# Patient Record
Sex: FEMALE | Race: WHITE | NOT HISPANIC OR LATINO
[De-identification: names, ages, dates, MRNs, and addresses within clinical notes are randomized per-mention and may not be internally consistent; named-entity substitution may affect disease eponyms.]

---

## 2019-11-08 ENCOUNTER — APPOINTMENT (OUTPATIENT)
Dept: INTERNAL MEDICINE | Facility: CLINIC | Age: 79
End: 2019-11-08

## 2019-11-10 ENCOUNTER — INPATIENT (INPATIENT)
Facility: HOSPITAL | Age: 79
LOS: 3 days | Discharge: NURSING, REHAB OR LONG-TERM CARE | DRG: 689 | End: 2019-11-14
Attending: INTERNAL MEDICINE | Admitting: INTERNAL MEDICINE
Payer: MEDICARE

## 2019-11-10 VITALS
OXYGEN SATURATION: 99 % | RESPIRATION RATE: 18 BRPM | TEMPERATURE: 98 F | DIASTOLIC BLOOD PRESSURE: 87 MMHG | WEIGHT: 145.06 LBS | HEART RATE: 84 BPM | SYSTOLIC BLOOD PRESSURE: 158 MMHG

## 2019-11-10 DIAGNOSIS — Z90.49 ACQUIRED ABSENCE OF OTHER SPECIFIED PARTS OF DIGESTIVE TRACT: Chronic | ICD-10-CM

## 2019-11-10 DIAGNOSIS — R62.7 ADULT FAILURE TO THRIVE: ICD-10-CM

## 2019-11-10 LAB
ALBUMIN SERPL ELPH-MCNC: 4.2 G/DL — SIGNIFICANT CHANGE UP (ref 3.3–5)
ALP SERPL-CCNC: 72 U/L — SIGNIFICANT CHANGE UP (ref 40–120)
ALT FLD-CCNC: 28 U/L — SIGNIFICANT CHANGE UP (ref 10–45)
ANION GAP SERPL CALC-SCNC: 19 MMOL/L — HIGH (ref 5–17)
APPEARANCE UR: ABNORMAL
AST SERPL-CCNC: 44 U/L — HIGH (ref 10–40)
BACTERIA # UR AUTO: NEGATIVE — SIGNIFICANT CHANGE UP
BASOPHILS # BLD AUTO: 0.02 K/UL — SIGNIFICANT CHANGE UP (ref 0–0.2)
BASOPHILS NFR BLD AUTO: 0.1 % — SIGNIFICANT CHANGE UP (ref 0–2)
BILIRUB SERPL-MCNC: 0.6 MG/DL — SIGNIFICANT CHANGE UP (ref 0.2–1.2)
BILIRUB UR-MCNC: NEGATIVE — SIGNIFICANT CHANGE UP
BUN SERPL-MCNC: 14 MG/DL — SIGNIFICANT CHANGE UP (ref 7–23)
CALCIUM SERPL-MCNC: 10.2 MG/DL — SIGNIFICANT CHANGE UP (ref 8.4–10.5)
CHLORIDE SERPL-SCNC: 99 MMOL/L — SIGNIFICANT CHANGE UP (ref 96–108)
CO2 SERPL-SCNC: 20 MMOL/L — LOW (ref 22–31)
COLOR SPEC: SIGNIFICANT CHANGE UP
CREAT SERPL-MCNC: 0.81 MG/DL — SIGNIFICANT CHANGE UP (ref 0.5–1.3)
DIFF PNL FLD: ABNORMAL
EOSINOPHIL # BLD AUTO: 0 K/UL — SIGNIFICANT CHANGE UP (ref 0–0.5)
EOSINOPHIL NFR BLD AUTO: 0 % — SIGNIFICANT CHANGE UP (ref 0–6)
EPI CELLS # UR: 1 — SIGNIFICANT CHANGE UP
GLUCOSE SERPL-MCNC: 139 MG/DL — HIGH (ref 70–99)
GLUCOSE UR QL: NEGATIVE — SIGNIFICANT CHANGE UP
GRAN CASTS # UR COMP ASSIST: 1 /LPF — SIGNIFICANT CHANGE UP
HCT VFR BLD CALC: 40.1 % — SIGNIFICANT CHANGE UP (ref 34.5–45)
HGB BLD-MCNC: 13.3 G/DL — SIGNIFICANT CHANGE UP (ref 11.5–15.5)
HYALINE CASTS # UR AUTO: 4 /LPF — SIGNIFICANT CHANGE UP (ref 0–7)
IMM GRANULOCYTES NFR BLD AUTO: 0.5 % — SIGNIFICANT CHANGE UP (ref 0–1.5)
KETONES UR-MCNC: ABNORMAL
LEUKOCYTE ESTERASE UR-ACNC: ABNORMAL
LYMPHOCYTES # BLD AUTO: 1.46 K/UL — SIGNIFICANT CHANGE UP (ref 1–3.3)
LYMPHOCYTES # BLD AUTO: 9.1 % — LOW (ref 13–44)
MAGNESIUM SERPL-MCNC: 1.9 MG/DL — SIGNIFICANT CHANGE UP (ref 1.6–2.6)
MCHC RBC-ENTMCNC: 28.5 PG — SIGNIFICANT CHANGE UP (ref 27–34)
MCHC RBC-ENTMCNC: 33.2 GM/DL — SIGNIFICANT CHANGE UP (ref 32–36)
MCV RBC AUTO: 86.1 FL — SIGNIFICANT CHANGE UP (ref 80–100)
MONOCYTES # BLD AUTO: 1.31 K/UL — HIGH (ref 0–0.9)
MONOCYTES NFR BLD AUTO: 8.2 % — SIGNIFICANT CHANGE UP (ref 2–14)
NEUTROPHILS # BLD AUTO: 13.18 K/UL — HIGH (ref 1.8–7.4)
NEUTROPHILS NFR BLD AUTO: 82.1 % — HIGH (ref 43–77)
NITRITE UR-MCNC: NEGATIVE — SIGNIFICANT CHANGE UP
NRBC # BLD: 0 /100 WBCS — SIGNIFICANT CHANGE UP (ref 0–0)
PH UR: 6 — SIGNIFICANT CHANGE UP (ref 5–8)
PHOSPHATE SERPL-MCNC: 2.9 MG/DL — SIGNIFICANT CHANGE UP (ref 2.5–4.5)
PLATELET # BLD AUTO: 262 K/UL — SIGNIFICANT CHANGE UP (ref 150–400)
POTASSIUM SERPL-MCNC: 4.1 MMOL/L — SIGNIFICANT CHANGE UP (ref 3.5–5.3)
POTASSIUM SERPL-SCNC: 4.1 MMOL/L — SIGNIFICANT CHANGE UP (ref 3.5–5.3)
PROT SERPL-MCNC: 8.2 G/DL — SIGNIFICANT CHANGE UP (ref 6–8.3)
PROT UR-MCNC: ABNORMAL
RBC # BLD: 4.66 M/UL — SIGNIFICANT CHANGE UP (ref 3.8–5.2)
RBC # FLD: 13.2 % — SIGNIFICANT CHANGE UP (ref 10.3–14.5)
RBC CASTS # UR COMP ASSIST: 3 /HPF — SIGNIFICANT CHANGE UP (ref 0–4)
SODIUM SERPL-SCNC: 138 MMOL/L — SIGNIFICANT CHANGE UP (ref 135–145)
SP GR SPEC: 1.04 — HIGH (ref 1.01–1.02)
T PALLIDUM AB TITR SER: NEGATIVE — SIGNIFICANT CHANGE UP
TROPONIN T, HIGH SENSITIVITY RESULT: 22 NG/L — SIGNIFICANT CHANGE UP (ref 0–51)
UROBILINOGEN FLD QL: NEGATIVE — SIGNIFICANT CHANGE UP
WBC # BLD: 16.05 K/UL — HIGH (ref 3.8–10.5)
WBC # FLD AUTO: 16.05 K/UL — HIGH (ref 3.8–10.5)
WBC UR QL: 5 /HPF — SIGNIFICANT CHANGE UP (ref 0–5)

## 2019-11-10 PROCEDURE — 71260 CT THORAX DX C+: CPT | Mod: 26

## 2019-11-10 PROCEDURE — 74177 CT ABD & PELVIS W/CONTRAST: CPT | Mod: 26

## 2019-11-10 PROCEDURE — 72125 CT NECK SPINE W/O DYE: CPT | Mod: 26

## 2019-11-10 PROCEDURE — 99285 EMERGENCY DEPT VISIT HI MDM: CPT

## 2019-11-10 PROCEDURE — 70450 CT HEAD/BRAIN W/O DYE: CPT | Mod: 26

## 2019-11-10 RX ORDER — ENOXAPARIN SODIUM 100 MG/ML
30 INJECTION SUBCUTANEOUS DAILY
Refills: 0 | Status: DISCONTINUED | OUTPATIENT
Start: 2019-11-10 | End: 2019-11-14

## 2019-11-10 RX ORDER — LEVOTHYROXINE SODIUM 125 MCG
1 TABLET ORAL
Qty: 0 | Refills: 0 | DISCHARGE

## 2019-11-10 RX ORDER — ASPIRIN/CALCIUM CARB/MAGNESIUM 324 MG
81 TABLET ORAL DAILY
Refills: 0 | Status: DISCONTINUED | OUTPATIENT
Start: 2019-11-10 | End: 2019-11-14

## 2019-11-10 RX ORDER — AMLODIPINE BESYLATE 2.5 MG/1
5 TABLET ORAL DAILY
Refills: 0 | Status: DISCONTINUED | OUTPATIENT
Start: 2019-11-10 | End: 2019-11-13

## 2019-11-10 RX ORDER — ESCITALOPRAM OXALATE 10 MG/1
20 TABLET, FILM COATED ORAL DAILY
Refills: 0 | Status: DISCONTINUED | OUTPATIENT
Start: 2019-11-10 | End: 2019-11-14

## 2019-11-10 RX ORDER — SODIUM CHLORIDE 9 MG/ML
1000 INJECTION INTRAMUSCULAR; INTRAVENOUS; SUBCUTANEOUS ONCE
Refills: 0 | Status: COMPLETED | OUTPATIENT
Start: 2019-11-10 | End: 2019-11-10

## 2019-11-10 RX ORDER — ESCITALOPRAM OXALATE 10 MG/1
1 TABLET, FILM COATED ORAL
Qty: 0 | Refills: 0 | DISCHARGE

## 2019-11-10 RX ORDER — ATORVASTATIN CALCIUM 80 MG/1
10 TABLET, FILM COATED ORAL AT BEDTIME
Refills: 0 | Status: DISCONTINUED | OUTPATIENT
Start: 2019-11-10 | End: 2019-11-14

## 2019-11-10 RX ADMIN — SODIUM CHLORIDE 1000 MILLILITER(S): 9 INJECTION INTRAMUSCULAR; INTRAVENOUS; SUBCUTANEOUS at 18:26

## 2019-11-10 NOTE — ED PROVIDER NOTE - ATTENDING CONTRIBUTION TO CARE
80yo female pmh HTN, hypothyroid p/w unwitnessed fall, pt unable to explain events of fall, unknown time. Pt lives alone "with tenants" as per niece who states pt is becoming more forgetful and has had slurred speech x 2-3 months. On aspirin intermittently. Pt AAOx3, slurred speech, lungs clear, no murmurs, abd soft, extremities full ROM and full strength. EOMI PERRL. ~C7 T1 spinal TTP with no stepoffs. Ecchymosis left flank. Concern for worsening dementia vs acute exacerbation of dementia from infectious source. Labs with UA, B12 folate, CK and trop, pan scan due to ecchymosis and spinal tenderness. IVF. EKG No STEMI. Despite AAOx3, pt with slow responses, unsure of events, appears to be unsafe discharge if workup negative. Will discuss further with pt and family

## 2019-11-10 NOTE — ED PROVIDER NOTE - OBJECTIVE STATEMENT
78 y/o woman with PMHx HTN, dementia, hypothyroid BIBEMS for unwitnessed fall. Per niece and sister at bedside, patient lives alone, and for the last "couple" of months developed dysarthria and had been getting progressively more forgetful for the last (6 months?) or so. Never evaluated for stroke, never evaluated for dementia. She does not have any children and she is , and does not have assistance with ADLs. Sister is next of kin. Last evening, patient called niece's residence. She is unaccounted for until this AM around 10, when sister called her about [employer] and learned she had fallen down. A neighbor called 911. Patient has no memory of what happened. Fall was unwitnessed, unknown if there was LOC. Denies pain, however, poor historian. No fever, chills, chest pain, SOB, n/v/d. Unclear if patient has been able to take medications as prescribed.

## 2019-11-10 NOTE — H&P ADULT - NSICDXPASTMEDICALHX_GEN_ALL_CORE_FT
PAST MEDICAL HISTORY:  Depression     Diverticulosis     Hypertension, unspecified type     Hypothyroidism, unspecified type

## 2019-11-10 NOTE — ED PROVIDER NOTE - PROGRESS NOTE DETAILS
pending CTH/CAP, labs, consider social admit, pt lives alone and cannot perform ADLs CT negative for traumatic pathology. +leukocytosis, pt encouraged to urinate now. If unable, will bladder scan and place Bull if necessary

## 2019-11-10 NOTE — H&P ADULT - ASSESSMENT
80 yo woman w progressive worsening in function w memory loss, frequent falls, dysarthria, house unkept, not clear if taking her meds, ptn is unkept, has multiple echymoses  PLAN:  R/O recent CVA w MRI/MRA H/N, Neuro consult  get TTE, get Carotid DUplex  get LE Duplex  hydrate w NS, trend BPs, check TSH, Vit B12, Folate,RPR  rpt EKG in am  PT eval  SOcial work consult  Niece is not her HCP and has looked at multiple assisted living and it appears ptn is ready for that transition  C d/w ptn/niece, she is full code  DVT ppx w sc Lovenox

## 2019-11-10 NOTE — ED ADULT NURSE NOTE - INTERVENTIONS DEFINITIONS
Stretcher in lowest position, wheels locked, appropriate side rails in place/Call bell, personal items and telephone within reach/Physically safe environment: no spills, clutter or unnecessary equipment/Provide visual clues: red socks Call bell, personal items and telephone within reach/Non-slip footwear when patient is off stretcher/Stretcher in lowest position, wheels locked, appropriate side rails in place/Provide visual cue, wrist band, yellow gown, etc./Physically safe environment: no spills, clutter or unnecessary equipment/Provide visual clues: red socks

## 2019-11-10 NOTE — ED ADULT NURSE REASSESSMENT NOTE - NS ED NURSE REASSESS COMMENT FT1
Pt laying comfortably in bed, accompanied by family. Pt and family aware of plan of care, awaiting CT scans, will continue to monitor.

## 2019-11-10 NOTE — ED ADULT NURSE NOTE - OBJECTIVE STATEMENT
79 year old female presenting to ED by EMS accompanied by family s/p fall early this morning. Family reports pt fell 2x this week, and over past few months the pt has noted slurred speech and forgetfulness. Pt does not recall how she fell and was on the floor for an unknown amount of time. Pt states, "I do not know why I'm here." Pt denies dizziness, chest pain, SOB, abdominal pain, N/V. 79 year old female presenting to ED by EMS accompanied by family s/p fall early this morning. Family reports pt fell 2x this week, and over past few months the pt has noted slurred speech and forgetfulness. Pt does not recall how she fell and was on the floor for an unknown amount of time. Pt states, "I do not know why I'm here." Pt denies headaches, changes in vision, chills, fevers, dizziness, chest pain, SOB, abdominal pain, N/V, burning or frequency upon urination, numbness and tingling.

## 2019-11-10 NOTE — ED PROVIDER NOTE - NS ED ROS FT
REVIEW OF SYSTEMS:    CONSTITUTIONAL: No weakness, fevers or chills  EYES/ENT: No visual changes;  no throat pain   NECK: No pain or stiffness  RESPIRATORY: No cough, wheezing, hemoptysis; No shortness of breath  CARDIOVASCULAR: No chest pain or palpitations  GASTROINTESTINAL: No abdominal or epigastric pain. No nausea, vomiting, or hematemesis; No diarrhea or constipation. No melena or hematochezia.  GENITOURINARY: No dysuria, change in frequency or hematuria  NEUROLOGICAL: No numbness or weakness, +spine pain  SKIN: No itching, burning, rashes, or lesions   All other review of systems is negative unless indicated above.

## 2019-11-10 NOTE — H&P ADULT - NSHPPHYSICALEXAM_GEN_ALL_CORE
T(F): 99.3 (11-10-19 @ 18:25), Max: 99.3 (11-10-19 @ 18:25)  HR: 76 (11-10-19 @ 17:11) (76 - 84)  BP: 129/85 (11-10-19 @ 17:11) (129/85 - 158/87)  RR: 18 (11-10-19 @ 17:11) (18 - 18)  SpO2: 98% (11-10-19 @ 17:11) (98% - 99%)    GENERAL: NAD, well-developed  HEAD:  Atraumatic, Normocephalic  EYES: EOMI, PERRLA, conjunctiva and sclera clear  NECK: Supple, No JVD  CHEST/LUNG: Clear to auscultation bilaterally; No wheeze  HEART: Regular rate and rhythm; No murmurs, rubs, or gallops  ABDOMEN: Soft, Nontender, Nondistended; Bowel sounds present  EXTREMITIES:  2+ Peripheral Pulses, No clubbing, cyanosis, or edema  PSYCH: AAOx3  NEUROLOGY: non-focal  SKIN: echymoses over flank, back

## 2019-11-10 NOTE — ED PROVIDER NOTE - PHYSICAL EXAMINATION
GENERAL: NAD  HEENT: EOMI, PERRL, MMM, no oropharyngeal lesions or erythema appreciated  Pulm: normal work of breathing, CTABL  CV: RRR, S1&S2+, no m/r/g appreciated  ABDOMEN: soft, nt, nd, no hepatosplenomegaly, BS+, +flank hematoma  MSK: nl ROM, , moving all extremities, tenderness over thoracic spine  EXTREMITIES:  no appreciable edema in b/l LE  Neuro: A&Ox3, no focal deficits  SKIN: warm and dry, no visible rash

## 2019-11-10 NOTE — H&P ADULT - HISTORY OF PRESENT ILLNESS
78 y/o woman with PMHx HTN, forgetfulness but still drives, hypothyroid but not on synthroid BIBEMS for unwitnessed fall. Per niece and sister at bedside, patient lives alone, and for the last "couple" of months developed dysarthria and had been getting progressively more forgetful for the last 6 months or so. Ptns house is unkept and for the past 2 months ptn has been unkept. Ptn has been resistant to see a doctor. Never evaluated for stroke, never evaluated for dementia. She does not have any children and she is , and does not have assistance with ADLs. Sister is next of kin. But while ptn is being evaluated she named her niece her HCP.   Last evening, patient called niece's residence. She is unaccounted for until this AM around 10, when sister called her about [employer] and learned she had fallen down. A neighbor called 911. Patient has no memory of what happened. Fall was unwitnessed, unknown if there was LOC. Denies pain, however, poor historian. Ptn has no insight into her progressive worsening function.  No fever, chills, chest pain, SOB, n/v/d. Unclear if patient has been able to take medications as prescribed.

## 2019-11-11 DIAGNOSIS — R47.9 UNSPECIFIED SPEECH DISTURBANCES: ICD-10-CM

## 2019-11-11 LAB
ANION GAP SERPL CALC-SCNC: 19 MMOL/L — HIGH (ref 5–17)
BUN SERPL-MCNC: 12 MG/DL — SIGNIFICANT CHANGE UP (ref 7–23)
CALCIUM SERPL-MCNC: 10.1 MG/DL — SIGNIFICANT CHANGE UP (ref 8.4–10.5)
CHLORIDE SERPL-SCNC: 98 MMOL/L — SIGNIFICANT CHANGE UP (ref 96–108)
CK SERPL-CCNC: 1235 U/L — HIGH (ref 25–170)
CO2 SERPL-SCNC: 21 MMOL/L — LOW (ref 22–31)
CREAT SERPL-MCNC: 0.82 MG/DL — SIGNIFICANT CHANGE UP (ref 0.5–1.3)
CULTURE RESULTS: SIGNIFICANT CHANGE UP
FOLATE SERPL-MCNC: >20 NG/ML — SIGNIFICANT CHANGE UP
FOLATE SERPL-MCNC: >20 NG/ML — SIGNIFICANT CHANGE UP
GLUCOSE SERPL-MCNC: 101 MG/DL — HIGH (ref 70–99)
HBA1C BLD-MCNC: 5.4 % — SIGNIFICANT CHANGE UP (ref 4–5.6)
HCT VFR BLD CALC: 40.9 % — SIGNIFICANT CHANGE UP (ref 34.5–45)
HGB BLD-MCNC: 13.5 G/DL — SIGNIFICANT CHANGE UP (ref 11.5–15.5)
MCHC RBC-ENTMCNC: 28.5 PG — SIGNIFICANT CHANGE UP (ref 27–34)
MCHC RBC-ENTMCNC: 33 GM/DL — SIGNIFICANT CHANGE UP (ref 32–36)
MCV RBC AUTO: 86.5 FL — SIGNIFICANT CHANGE UP (ref 80–100)
PLATELET # BLD AUTO: 268 K/UL — SIGNIFICANT CHANGE UP (ref 150–400)
POTASSIUM SERPL-MCNC: 3.7 MMOL/L — SIGNIFICANT CHANGE UP (ref 3.5–5.3)
POTASSIUM SERPL-SCNC: 3.7 MMOL/L — SIGNIFICANT CHANGE UP (ref 3.5–5.3)
RBC # BLD: 4.73 M/UL — SIGNIFICANT CHANGE UP (ref 3.8–5.2)
RBC # FLD: 13.4 % — SIGNIFICANT CHANGE UP (ref 10.3–14.5)
SODIUM SERPL-SCNC: 138 MMOL/L — SIGNIFICANT CHANGE UP (ref 135–145)
SPECIMEN SOURCE: SIGNIFICANT CHANGE UP
T PALLIDUM AB TITR SER: NEGATIVE — SIGNIFICANT CHANGE UP
TSH SERPL-MCNC: 1.08 UIU/ML — SIGNIFICANT CHANGE UP (ref 0.27–4.2)
TSH SERPL-MCNC: 1.7 UIU/ML — SIGNIFICANT CHANGE UP (ref 0.27–4.2)
VIT B12 SERPL-MCNC: 1073 PG/ML — SIGNIFICANT CHANGE UP (ref 232–1245)
VIT B12 SERPL-MCNC: 1140 PG/ML — SIGNIFICANT CHANGE UP (ref 232–1245)
WBC # BLD: 14.36 K/UL — HIGH (ref 3.8–10.5)
WBC # FLD AUTO: 14.36 K/UL — HIGH (ref 3.8–10.5)

## 2019-11-11 PROCEDURE — 70551 MRI BRAIN STEM W/O DYE: CPT | Mod: 26

## 2019-11-11 PROCEDURE — 99222 1ST HOSP IP/OBS MODERATE 55: CPT

## 2019-11-11 PROCEDURE — 70544 MR ANGIOGRAPHY HEAD W/O DYE: CPT | Mod: 26,59

## 2019-11-11 PROCEDURE — 93880 EXTRACRANIAL BILAT STUDY: CPT | Mod: 26

## 2019-11-11 PROCEDURE — 70547 MR ANGIOGRAPHY NECK W/O DYE: CPT | Mod: 26

## 2019-11-11 RX ORDER — CEFTRIAXONE 500 MG/1
INJECTION, POWDER, FOR SOLUTION INTRAMUSCULAR; INTRAVENOUS
Refills: 0 | Status: DISCONTINUED | OUTPATIENT
Start: 2019-11-11 | End: 2019-11-14

## 2019-11-11 RX ORDER — CEFTRIAXONE 500 MG/1
1000 INJECTION, POWDER, FOR SOLUTION INTRAMUSCULAR; INTRAVENOUS ONCE
Refills: 0 | Status: COMPLETED | OUTPATIENT
Start: 2019-11-11 | End: 2019-11-11

## 2019-11-11 RX ORDER — SODIUM CHLORIDE 9 MG/ML
1000 INJECTION INTRAMUSCULAR; INTRAVENOUS; SUBCUTANEOUS
Refills: 0 | Status: DISCONTINUED | OUTPATIENT
Start: 2019-11-11 | End: 2019-11-14

## 2019-11-11 RX ORDER — CEFTRIAXONE 500 MG/1
1000 INJECTION, POWDER, FOR SOLUTION INTRAMUSCULAR; INTRAVENOUS EVERY 24 HOURS
Refills: 0 | Status: DISCONTINUED | OUTPATIENT
Start: 2019-11-12 | End: 2019-11-14

## 2019-11-11 RX ADMIN — Medication 81 MILLIGRAM(S): at 14:19

## 2019-11-11 RX ADMIN — ESCITALOPRAM OXALATE 20 MILLIGRAM(S): 10 TABLET, FILM COATED ORAL at 14:19

## 2019-11-11 RX ADMIN — AMLODIPINE BESYLATE 5 MILLIGRAM(S): 2.5 TABLET ORAL at 06:34

## 2019-11-11 RX ADMIN — ENOXAPARIN SODIUM 30 MILLIGRAM(S): 100 INJECTION SUBCUTANEOUS at 14:19

## 2019-11-11 RX ADMIN — CEFTRIAXONE 100 MILLIGRAM(S): 500 INJECTION, POWDER, FOR SOLUTION INTRAMUSCULAR; INTRAVENOUS at 14:20

## 2019-11-11 RX ADMIN — ATORVASTATIN CALCIUM 10 MILLIGRAM(S): 80 TABLET, FILM COATED ORAL at 21:16

## 2019-11-11 RX ADMIN — SODIUM CHLORIDE 75 MILLILITER(S): 9 INJECTION INTRAMUSCULAR; INTRAVENOUS; SUBCUTANEOUS at 10:55

## 2019-11-11 NOTE — CONSULT NOTE ADULT - ASSESSMENT
78 y/o woman with PMHx HTN, hypothyroid, progressive dysarthtia, BIBEMS for unwitnessed fall. ENT consulted for slurred speech and dysphonia per primary team.  Per family at bedside they state patient voice appears to sound as baseline with slight slur. Tone, strength and sound is same. Per exam pt exhibits quality sound and strength without suspicion for vocal paralysis. Bedside laryngoscopy attempted multiple times to access for possible vocal cord etiology. Pt uncooperative and further refused scope. Risks and complications discussed with the patient and family for refusing scope.

## 2019-11-11 NOTE — CONSULT NOTE ADULT - SUBJECTIVE AND OBJECTIVE BOX
[facility]        Patient is a 79y old  Female who presents with a chief complaint of slurred speech, frequent falls (2019 10:55)    Excerpt from H&P,80 y/o woman with PMHx HTN, forgetfulness but still drives, hypothyroid but not on synthroid BIBEMS for unwitnessed fall. Per niece and sister at bedside, patient lives alone, and for the last "couple" of months developed dysarthria and had been getting progressively more forgetful for the last 6 months or so. Ptns house is unkept and for the past 2 months ptn has been unkept. Ptn has been resistant to see a doctor. Never evaluated for stroke, never evaluated for dementia. She does not have any children and she is , and does not have assistance with ADLs. Sister is next of kin. But while ptn is being evaluated she named her niece her HCP.   Last evening, patient called niece's residence. She is unaccounted for until this AM around 10, when sister called her about [employer] and learned she had fallen down. A neighbor called 911. Patient has no memory of what happened. Fall was unwitnessed, unknown if there was LOC. Denies pain, however, poor historian. Ptn has no insight into her progressive worsening function.  No fever, chills, chest pain, SOB, n/v/d. Unclear if patient has been able to take medications as prescribed.            *****PAST MEDICAL / Surgical  HISTORY:  PAST MEDICAL & SURGICAL HISTORY:  Diverticulosis  Depression  Hypertension, unspecified type  Hypothyroidism, unspecified type  History of cholecystectomy           *****FAMILY HISTORY:  FAMILY HISTORY:  No pertinent family history in first degree relatives           *****SOCIAL HISTORY:  Alcohol: None  Smoking: None  worked as a  at [employer]        *****ALLERGIES:   Allergies    No Known Allergies    Intolerances             *****MEDICATIONS: current medication reviewed and documented.   MEDICATIONS  (STANDING):  amLODIPine   Tablet 5 milliGRAM(s) Oral daily  aspirin enteric coated 81 milliGRAM(s) Oral daily  atorvastatin 10 milliGRAM(s) Oral at bedtime  enoxaparin Injectable 30 milliGRAM(s) SubCutaneous daily  escitalopram 20 milliGRAM(s) Oral daily  sodium chloride 0.9%. 1000 milliLiter(s) (75 mL/Hr) IV Continuous <Continuous>    MEDICATIONS  (PRN):           *****REVIEW OF SYSTEM:  GEN: no fever, no chills, no pain  RESP: no SOB, no cough, no sputum  CVS: no chest pain, no palpitations, no edema  GI: no abdominal pain, no nausea, no vomiting, no constipation, no diarrhea  : no dysurea, no frequency, no hematurea  Neuro: no headache, no dizziness  PSYCH: no anxiety, no depression  Derm : no itching, no rash         *****VITAL SIGNS:  T(C): 36.2 (19 @ 12:28), Max: 37.4 (11-10-19 @ 18:25)  HR: 74 (19 @ 12:) (74 - 84)  BP: 139/85 (19 @ 12:28) (129/85 - 158/87)  RR: 18 (19 @ 12:28) (18 - 18)  SpO2: 98% (19 @ 12:28) (97% - 100%)  Wt(kg): --           *****PHYSICAL EXAM:   Alert oriented x 3   Attention comprehension are fair. Able to name, repeat, read without any difficulty.   Able to follow 3 step commands.     EOM limited upgaze did not appreciate fatiguing with upgaze    VFF to confrontration   upgaze with nystagmus     No facial asymmetry   dysarthria    hoarse voice   Tongue is midline, atrophy of tongue   Palate elevates symmetrically   Moving all 4 ext symmetrically no pronator drift   Reflexes are 2+ throughout   no fasciculations of the muscles noted.   sensation is grossly symmetric  Gait : not assessed.  B/L down going toes      no clonus          *****LAB AND IMAGIN.5   14.36 )-----------( 268      ( 2019 08:07 )             40.9               11-11    138  |  98  |  12  ----------------------------<  101<H>  3.7   |  21<L>  |  0.82    Ca    10.1      2019 06:26  Phos  2.9     11-10  Mg     1.9     11-10    TPro  8.2  /  Alb  4.2  /  TBili  0.6  /  DBili  x   /  AST  44<H>  /  ALT  28  /  AlkPhos  72  11-10                CARDIAC MARKERS ( 2019 06:26 )  x     / x     / 1235 U/L / x     / x      CARDIAC MARKERS ( 10 Nov 2019 18:44 )  x     / x     / 1028 U/L / x     / x                  Urinalysis Basic - ( 10 Nov 2019 21:51 )    Color: Light Yellow / Appearance: Slightly Turbid / S.036 / pH: x  Gluc: x / Ketone: Large  / Bili: Negative / Urobili: Negative   Blood: x / Protein: 30 mg/dL / Nitrite: Negative   Leuk Esterase: Moderate / RBC: 3 /hpf / WBC 5 /HPF   Sq Epi: x / Non Sq Epi: 1 / Bacteria: Negative    < from: CT Head No Cont (11.10.19 @ 20:03) >  The CT examination demonstrates generalized volume loss as manifested by   the enlargement of the ventricles, cisternal spaces, and cortical sulci   throughout.     There is no acute intracranial hemorrhage, mass effect, or midline shift.     Mild patchy hypodensities is noted in the periventricular white matter   which most likely represents chronic microvascular ischemic changes given   the patient's age.     The ventricles and sulci are appropriate in size and configuration for   the patient's age.     The visualized paranasal sinuses andmastoids are well aerated.     The bony calvarium is intact. The visualized soft tissues are   unremarkable.    CERVICAL SPINE    There is reversal the normal cervical lordosis. There is no acute   displaced cervical spine fracture or evidence of traumatic malalignment.     There are multilevel degenerative changes characterized by disc   osteophyte complexes, facet and uncinate hypertrophy. This results in   mild   multilevel canal stenosis and multilevel neural foraminal narrowing. Disc   height loss is seen throughout the entirety of the cervical spine most   prominent at the C5-C6 and C7-T1 levels.    The prevertebral soft tissues are not widened. The regional soft tissues   of the neck are unremarkable. The lung apices are clear.        IMPRESSION:     CT Head: There is no acute intracranial hemorrhage, mass effect, or   midline shift.    CT Cervical Spine: No acute displaced cervical spine fracture or evidence   of traumatic malalignment.     < end of copied text >      [All pertinent recent Imaging reports reviewed]         *****A S S E S S M E N T   A N D   P L A N :        80 y/o woman with PMHx HTN, forgetfulness but still drives, hypothyroid but not on synthroid BIBEMS for unwitnessed fall. Per niece and sister at bedside, patient lives alone, and for the last "couple" of months developed dysarthria and had been getting progressively more forgetful for the last 6 months or so. Ptns house is unkept and for the past 2 months ptn has been unkept. Ptn has been resistant to see a doctor. Never evaluated for stroke, never evaluated for dementia. She does not have any children and she is , and does not have assistance with ADLs. Sister is next of kin. But while ptn is being evaluated she named her niece her HCP.   Last evening, patient called niece's residence. She is unaccounted for until this AM around 10, when sister called her about [employer] and learned she had fallen down. A neighbor called 911. Patient has no memory of what happened. Fall was unwitnessed, unknown if there was LOC. Denies pain, however, poor historian. Ptn has no insight into her progressive worsening function.  No fever, chills, chest pain, SOB, n/v/d. Unclear if patient has been able to take medications as prescribed.       Problem/Recommendations 1: dysarthia, imbalance with progressive decline over the last 2 mos.   also noted on exam tongue atrophy, possible tongue fasiculations.   difficulty with upgaze, hoarseness.     concerning for cva in the thalamus/brain stem  vs. neuromuscular disease vs. progressive supranuclear palsy   mri brain   mra head  will get acetylcholine receptor antibody  emg/ncv outpt maybe helpful   elevated CPK likely rhabdo  s/s eval as pt seems to be having difficulty with food bolus   ck for vocal cord paralysis  please speak to medical attending before ordering any of the studies.         Problem/Recommendations 2: elevated CPK   trend please        ___________________________  Will follow with you.  Thank you,  Wendy Joyce MD  Diplomate of the American Board of Neurology and Psychiatry.  Diplomate of the American Board of Vascular Neurology.   [facility]   Ph: [phone]    Differential diagnosis and plan of care discussed with patient after the evaluation.   Advanced care planning options discussed.   Pain assessed and judicious use of narcotics when appropriate was discussed.  Importance of Fall prevention discussed.  Counseling on Smoking and Alcohol cessation was offered when appropriate.  Counseling on Diet, exercise, and medication compliance was done.     123 minutes spent on the total encounter;  more than 50 % of the visit was spent on counseling  and or coordinating care by the attending physician.    Thank you for allowing me to participate in the care of this nina patient. Please do not hesitate to call me if you have any questions.     This and subsequent notes were partially created using voice recognition software and will  inherently be subject to errors including those of syntax and sound alike substitutions which may escape proofreading. In such instances original meaning may be extrapolated by contextual derivation.

## 2019-11-11 NOTE — CONSULT NOTE ADULT - SUBJECTIVE AND OBJECTIVE BOX
CHIEF COMPLAINT:      HISTORY OF PRESENT ILLNESS:  80 y/o woman with PMHx HTN, forgetfulness but still drives, hypothyroid but not on synthroid BIBEMS for unwitnessed fall. Per niece and sister at bedside, patient lives alone, and for the last "couple" of months developed dysarthria and had been getting progressively more forgetful for the last 6 months or so. Ptns house is unkept and for the past 2 months ptn has been unkept. Ptn has been resistant to see a doctor. Never evaluated for stroke, never evaluated for dementia. She does not have any children and she is , and does not have assistance with ADLs. Sister is next of kin. But while ptn is being evaluated she named her niece her HCP.   Last evening, patient called niece's residence. She is unaccounted for until this AM around 10, when sister called her about [employer] and learned she had fallen down. A neighbor called 911. Patient has no memory of what happened. Fall was unwitnessed, unknown if there was LOC. Denies pain, however, poor historian. Ptn has no insight into her progressive worsening function.  No fever, chills, chest pain, SOB, n/v/d. Unclear if patient has been able to take medications as prescribed.        PAST MEDICAL & SURGICAL HISTORY:  Diverticulosis  Depression  Hypertension, unspecified type  Hypothyroidism, unspecified type  History of cholecystectomy          MEDICATIONS:  amLODIPine   Tablet 5 milliGRAM(s) Oral daily  aspirin enteric coated 81 milliGRAM(s) Oral daily  enoxaparin Injectable 30 milliGRAM(s) SubCutaneous daily        escitalopram 20 milliGRAM(s) Oral daily      atorvastatin 10 milliGRAM(s) Oral at bedtime    sodium chloride 0.9%. 1000 milliLiter(s) IV Continuous <Continuous>      FAMILY HISTORY:  No pertinent family history in first degree relatives      SOCIAL HISTORY:    [ ] Non-smoker  [ ] Smoker  [ ] Alcohol    Allergies    No Known Allergies    Intolerances    	    REVIEW OF SYSTEMS:  CONSTITUTIONAL: No fever, weight loss, + fatigue  EYES: No eye pain, visual disturbances, or discharge  ENMT:  No difficulty hearing, tinnitus, vertigo; No sinus or throat pain  NECK: No pain or stiffness  RESPIRATORY: No cough, wheezing, chills or hemoptysis; No Shortness of Breath  CARDIOVASCULAR: No chest pain, palpitations, passing out, dizziness, or leg swelling  GASTROINTESTINAL: No abdominal or epigastric pain. No nausea, vomiting, or hematemesis; No diarrhea or constipation. No melena or hematochezia.  GENITOURINARY: No dysuria, frequency, hematuria, or incontinence  NEUROLOGICAL: No headaches, memory loss, ++loss of strength, numbness, or tremors  SKIN: No itching, burning, rashes, or lesions   LYMPH Nodes: No enlarged glands  ENDOCRINE: No heat or cold intolerance; No hair loss  MUSCULOSKELETAL:+ joint pain or swelling; No muscle, back, or extremity pain  PSYCHIATRIC: No depression, anxiety, mood swings, or difficulty sleeping  HEME/LYMPH: No easy bruising, or bleeding gums  ALLERY AND IMMUNOLOGIC: No hives or eczema	    [ ] All others negative	  [ ] Unable to obtain    PHYSICAL EXAM:  T(C): 36.5 (11-11-19 @ 08:01), Max: 37.4 (11-10-19 @ 18:25)  HR: 75 (11-11-19 @ 08:01) (75 - 84)  BP: 137/77 (11-11-19 @ 08:01) (129/85 - 158/87)  RR: 18 (11-11-19 @ 08:01) (18 - 18)  SpO2: 97% (11-11-19 @ 08:01) (97% - 100%)  Wt(kg): --  I&O's Summary      Appearance: Normal	  HEENT:   Normal oral mucosa, PERRL, EOMI	  Lymphatic: No lymphadenopathy  Cardiovascular: Normal S1 S2, No JVD, No murmurs, No edema  Respiratory: Lungs clear to auscultation	  Psychiatry: A & O x 3, Mood & affect appropriate  Gastrointestinal:  Soft, Non-tender, + BS	  Skin: No rashes, No ecchymoses, No cyanosis	  Neurologic: Non-focal  Extremities: Normal range of motion, No clubbing, cyanosis or edema  Vascular: Peripheral pulses palpable 2+ bilaterally    TELEMETRY: 	    ECG:  	NSR, LVH, non specific stt changes   RADIOLOGY: < from: CT Head No Cont (11.10.19 @ 20:03) >    EXAM:  CT CERVICAL SPINE                          EXAM:  CT BRAIN                            PROCEDURE DATE:  11/10/2019            INTERPRETATION:  CLINICAL INFORMATION: Trauma; head strike; fall.    COMPARISON: None available.    TECHNIQUE: Multiple axial CT images of the head and cervical spine were   obtained without the administration of intravenous contrast. Sagittal and   coronal reformatted images were also reviewed.    FINDINGS:    HEAD     The CT examination demonstrates generalized volume loss as manifested by   the enlargement of the ventricles, cisternal spaces, and cortical sulci   throughout.     There is no acute intracranial hemorrhage, mass effect, or midline shift.     Mild patchy hypodensities is noted in the periventricular white matter   which most likely represents chronic microvascular ischemic changes given   the patient's age.     The ventricles and sulci are appropriate in size and configuration for   the patient's age.     The visualized paranasal sinuses andmastoids are well aerated.     The bony calvarium is intact. The visualized soft tissues are   unremarkable.    CERVICAL SPINE    There is reversal the normal cervical lordosis. There is no acute   displaced cervical spine fracture or evidence of traumatic malalignment.     There are multilevel degenerative changes characterized by disc   osteophyte complexes, facet and uncinate hypertrophy. This results in   mild   multilevel canal stenosis and multilevel neural foraminal narrowing. Disc   height loss is seen throughout the entirety of the cervical spine most   prominent at the C5-C6 and C7-T1 levels.    The prevertebral soft tissues are not widened. The regional soft tissues   of the neck are unremarkable. The lung apices are clear.        IMPRESSION:     CT Head: There is no acute intracranial hemorrhage, mass effect, or   midline shift.    CT Cervical Spine: No acute displaced cervical spine fracture or evidence   of traumatic malalignment.                 RICH MOONEY M.D., RADIOLOGY RESIDENT  This document has been electronically signed.  PAULETTE MILLIGAN M.D., ATTENDING RADIOLOGIST  This document has been electronically signed. Nov 10 2019  8:17PM                < from: CT Chest w/ IV Cont (11.10.19 @ 20:10) >    EXAM:  CT CHEST IC                          EXAM:  CT ABDOMEN AND PELVIS IC                            PROCEDURE DATE:  11/10/2019            INTERPRETATION:  CLINICAL INFORMATION: Unwitnessed fall flank hematoma     COMPARISON: None.    PROCEDURE:  CT of the Chest, Abdomen and Pelvis was performed with intravenous   contrast.   Imaging was performed through the chest in the arterial phase followed by   imaging of the abdomen and pelvis in the portal venous phase.  Intravenous contrast: 90 ml Omnipaque 350. 10 ml discarded.  Oral contrast: None.  Sagittal and coronal reformats were performed.    FINDINGS:    CHEST:   Motion degraded study.    LUNGS AND LARGE AIRWAYS: Patent central airways. Bilateral lower lobe   subsegmental atelectasis.  PLEURA: No pleural effusion.  VESSELS: Atherosclerotic changes. No dissection or central pulmonary   embolism.  HEART: Heart size is normal. No pericardial effusion.  MEDIASTINUM AND ELIUD: No lymphadenopathy.  CHEST WALL AND LOWER NECK: Within normallimits.    ABDOMEN AND PELVIS:  Evaluation is limited by motion.    LIVER: Within normal limits.  BILE DUCTS: Normal caliber.  GALLBLADDER: Cholecystectomy.  SPLEEN: Within normal limits.  PANCREAS: Within normal limits.  ADRENALS: Within normal limits.  KIDNEYS/URETERS: Left renal cyst.    BLADDER: Within normal limits.  REPRODUCTIVE ORGANS: Calcified uterine fibroid. Linear calcification in   both adnexa likely from old procedure. No adnexal masses.    BOWEL: No bowel obstruction. Scattered colonic diverticulosis without   evidence of acute diverticulitis. Appendix is normal.  PERITONEUM: No ascites.  VESSELS: Atherosclerotic changes.  RETROPERITONEUM/LYMPH NODES: No lymphadenopathy.    ABDOMINAL WALL: Small fat-containing umbilical hernia.  BONES: Degenerative changes.    IMPRESSION:   No acute traumatic injury in the chest abdomen or pelvis.                RICH MOONEY M.D., RADIOLOGY RESIDENT  This document has been electronically signed.  PAULETTE MILLIGAN M.D., ATTENDING RADIOLOGIST  This document has been electronically signed. Nov 10 2019  9:06PM                < end of copied text >      OTHER: 	  	  LABS:	 	    CARDIAC MARKERS:        Urinalysis (11.10.19 @ 21:51)    pH Urine: 6.0    Glucose Qualitative, Urine: Negative    Blood, Urine: Small    Color: Light Yellow    Urine Appearance: Slightly Turbid    Bilirubin: Negative    Ketone - Urine: Large    Specific Gravity: 1.036    Protein, Urine: 30 mg/dL    Urobilinogen: Negative    Nitrite: Negative    Leukocyte Esterase Concentration: Moderate                              13.5   14.36 )-----------( 268      ( 11 Nov 2019 08:07 )             40.9     11-11    138  |  98  |  12  ----------------------------<  101<H>  3.7   |  21<L>  |  0.82    Ca    10.1      11 Nov 2019 06:26  Phos  2.9     11-10  Mg     1.9     11-10    TPro  8.2  /  Alb  4.2  /  TBili  0.6  /  DBili  x   /  AST  44<H>  /  ALT  28  /  AlkPhos  72  11-10    proBNP:   Lipid Profile:   HgA1c: Hemoglobin A1C, Whole Blood: 5.4 % (11-11 @ 08:07)    TSH: Thyroid Stimulating Hormone, Serum: 1.70 uIU/mL (11-11 @ 08:00)  Thyroid Stimulating Hormone, Serum: 1.08 uIU/mL (11-10 @ 21:49)

## 2019-11-11 NOTE — PROGRESS NOTE ADULT - SUBJECTIVE AND OBJECTIVE BOX
Patient is a 79y old  Female who presents with a chief complaint of slurred speech, frequent falls (2019 11:07)      SUBJECTIVE / OVERNIGHT EVENTS: no changes, has leukourea, urine Cx sent, MRI/MRA brain still pending, seen by neuro,PT eval pending, may need a diagnostic LP    MEDICATIONS  (STANDING):  amLODIPine   Tablet 5 milliGRAM(s) Oral daily  aspirin enteric coated 81 milliGRAM(s) Oral daily  atorvastatin 10 milliGRAM(s) Oral at bedtime  cefTRIAXone   IVPB      enoxaparin Injectable 30 milliGRAM(s) SubCutaneous daily  escitalopram 20 milliGRAM(s) Oral daily  sodium chloride 0.9%. 1000 milliLiter(s) (75 mL/Hr) IV Continuous <Continuous>    MEDICATIONS  (PRN):      Vital Signs Last 24 Hrs  T(F): 97.2 (19 @ 12:28), Max: 99.3 (11-10-19 @ 18:25)  HR: 74 (19 @ 12:28) (74 - 84)  BP: 139/85 (19 @ 12:28) (129/85 - 158/87)  RR: 18 (19 @ 12:28) (18 - 18)  SpO2: 98% (19 @ 12:28) (97% - 100%)  Telemetry:   CAPILLARY BLOOD GLUCOSE        I&O's Summary      PHYSICAL EXAM:  GENERAL: NAD, well-developed  HEAD:  Atraumatic, Normocephalic  EYES: EOMI, PERRLA, conjunctiva and sclera clear  NECK: Supple, No JVD  CHEST/LUNG: Clear to auscultation bilaterally; No wheeze  HEART: Regular rate and rhythm; No murmurs, rubs, or gallops  ABDOMEN: Soft, Nontender, Nondistended; Bowel sounds present  EXTREMITIES:  2+ Peripheral Pulses, No clubbing, cyanosis, or edema  PSYCH: AAOx3  NEUROLOGY: non-focal  SKIN: No rashes or lesions    LABS:                        13.5   14.36 )-----------( 268      ( 2019 08:07 )             40.9         138  |  98  |  12  ----------------------------<  101<H>  3.7   |  21<L>  |  0.82    Ca    10.1      2019 06:26  Phos  2.9     11-10  Mg     1.9     -10    TPro  8.2  /  Alb  4.2  /  TBili  0.6  /  DBili  x   /  AST  44<H>  /  ALT  28  /  AlkPhos  72  -10      CARDIAC MARKERS ( 2019 06:26 )  x     / x     / 1235 U/L / x     / x      CARDIAC MARKERS ( 10 Nov 2019 18:44 )  x     / x     / 1028 U/L / x     / x          Urinalysis Basic - ( 10 Nov 2019 21:51 )    Color: Light Yellow / Appearance: Slightly Turbid / S.036 / pH: x  Gluc: x / Ketone: Large  / Bili: Negative / Urobili: Negative   Blood: x / Protein: 30 mg/dL / Nitrite: Negative   Leuk Esterase: Moderate / RBC: 3 /hpf / WBC 5 /HPF   Sq Epi: x / Non Sq Epi: 1 / Bacteria: Negative        RADIOLOGY & ADDITIONAL TESTS:    Imaging Personally Reviewed:    Consultant(s) Notes Reviewed:      Care Discussed with Consultants/Other Providers:

## 2019-11-11 NOTE — CONSULT NOTE ADULT - ASSESSMENT
78 yo woman w progressive worsening in function w memory loss, frequent falls, dysarthria, house unkept, not clear if taking her meds, ptn is unkept, has multiple echymoses

## 2019-11-11 NOTE — CONSULT NOTE ADULT - PROBLEM SELECTOR RECOMMENDATION 9
-Bedside laryngoscopy attempted multiple times to access for possible vocal cord etiology. Pt uncooperative and further refused scope. Risks and complications discussed with the patient and family for refusing scope.  - Recommend Speech and swallow evaluation  -Thyroid panel to access hx of hypothyroidism  -Reconsult PRN if patient amenable to scope or if any questions

## 2019-11-11 NOTE — CONSULT NOTE ADULT - SUBJECTIVE AND OBJECTIVE BOX
CC: Slurred speech/dysphonia    HPI: 78 y/o woman with PMHx HTN, forgetfulness but still drives, hypothyroid but not on synthroid BIBEMS for unwitnessed fall. Per niece and sister at bedside, patient lives alone, and for the last "couple" of months developed dysarthria and had been getting progressively more forgetful for the last 6 months or so. Ptns house is unkept and for the past 2 months ptn has been unkept. Ptn has been resistant to see a doctor. Never evaluated for stroke, never evaluated for dementia. She does not have any children and she is , and does not have assistance with ADLs. Sister is next of kin. But while ptn is being evaluated she named her niece her HCP.   Last evening, patient called niece's residence. She is unaccounted for until this AM around 10, when sister called her about [employer] and learned she had fallen down. A neighbor called 911. Patient has no memory of what happened. Fall was unwitnessed, unknown if there was LOC. Denies pain, however, poor historian. Ptn has no insight into her progressive worsening function.  No fever, chills, chest pain, SOB, n/v/d. ENT consulted for slurred speech and dysphonia per primary team.    Per family at bedside they state patient voice appears to sound as baseline with slight slur. Tone, strength and sound is same. Denies any hoarseness, dysphagia, dysphonia, sob, N/V, changes in sensation, Fevers/chills/odynophagia/hemoptysis/unintentional weight loss.     PAST MEDICAL & SURGICAL HISTORY:  Diverticulosis  Depression  Hypertension, unspecified type  Hypothyroidism, unspecified type  History of cholecystectomy    Allergies    No Known Allergies    Intolerances      MEDICATIONS  (STANDING):  amLODIPine   Tablet 5 milliGRAM(s) Oral daily  aspirin enteric coated 81 milliGRAM(s) Oral daily  atorvastatin 10 milliGRAM(s) Oral at bedtime  cefTRIAXone   IVPB      enoxaparin Injectable 30 milliGRAM(s) SubCutaneous daily  escitalopram 20 milliGRAM(s) Oral daily  sodium chloride 0.9%. 1000 milliLiter(s) (75 mL/Hr) IV Continuous <Continuous>    MEDICATIONS  (PRN):    Social History: No history of tobacco use, EtOH use, illicit drugs    ROS: ENT, GI, , CV, Pulm, Neuro, Psych, MS, Heme, Endo, Constitutional; all negative except as noted in HPI    Vital Signs Last 24 Hrs  T(C): 36.2 (11 Nov 2019 12:28), Max: 37.4 (10 Nov 2019 18:25)  T(F): 97.2 (11 Nov 2019 12:28), Max: 99.3 (10 Nov 2019 18:25)  HR: 74 (11 Nov 2019 12:28) (74 - 84)  BP: 139/85 (11 Nov 2019 12:28) (129/85 - 158/87)  BP(mean): --  RR: 18 (11 Nov 2019 12:28) (18 - 18)  SpO2: 98% (11 Nov 2019 12:28) (97% - 100%)                          13.5   14.36 )-----------( 268      ( 11 Nov 2019 08:07 )             40.9    11-11    138  |  98  |  12  ----------------------------<  101<H>  3.7   |  21<L>  |  0.82    Ca    10.1      11 Nov 2019 06:26  Phos  2.9     11-10  Mg     1.9     11-10    TPro  8.2  /  Alb  4.2  /  TBili  0.6  /  DBili  x   /  AST  44<H>  /  ALT  28  /  AlkPhos  72  11-10       PHYSICAL EXAM:  Gen: Awake and alert, NAD, well-developed, sitting down. Good voice strength and tone.   Head: Normocephalic, Atraumatic  Face: no edema/erythema/fluctuance, parotid glands soft without mass  Eyes: PERRL, EOMI, no scleral injection  Nose: Nares bilaterally patent, no discharge  Mouth: Mucosa moist, tongue/uvula midline, oropharynx clear  Neck: Flat, supple, no lymphadenopathy, trachea midline, no masses  Resp: breathing easily, no stridor  CV: No peripheral edema or cyanosis      FOE/LARYNGOSCOPY: Nasopharynx clear. Unable to visualize oropharynx & hypopharynx as pt was uncooperative and further refused scope despite multiple attempts.

## 2019-11-11 NOTE — PROGRESS NOTE ADULT - ASSESSMENT
78 yo woman w progressive worsening in function w memory loss, frequent falls, dysarthria, house unkept, not clear if taking her meds, ptn is unkept, has multiple echymoses  PLAN:  R/O recent CVA w MRI/MRA H/N, ptn may need a diagnostic LP  Neuro consult reveiwed  Carotid duplex is benign  get LE Duplex  Rhabdomyolysis CPK still above 1000  cont hydration w NS, trend BP, CPK  nl TSH, Vit B12, Folate, neg RPR  EKG w LVH and possible lateral infarct, TTE pending  PT eval  Social work consult  Niece is now her HCP form on the chart,  she has looked at multiple assisted living and it appears ptn is ready for that transition  [facility] d/w ptn/niece, she is full code  DVT ppx w sc Lovenox 78 yo woman w progressive worsening in function w memory loss, frequent falls, dysarthria, house unkept, not clear if taking her meds, ptn is unkept, has multiple echymoses  PLAN:  R/O recent CVA w MRI/MRA H/N, ptn may need a diagnostic LP  Neuro consult reviewed  dysarthria, questionable dysphagia, awaiting S&S eval and ENT consult to R/O vocal cord paralysis  Carotid duplex is benign  get LE Duplex  Rhabdomyolysis CPK still above 1000  cont hydration w NS, trend BP, CPK  nl TSH, Vit B12, Folate, neg RPR  UA c/w UTI, urine culture sent, start on CTX  EKG w LVH and possible lateral infarct, TTE pending  PT eval  Social work consult  Niece is now her HCP form on the chart,  she has looked at multiple assisted living and it appears ptn is ready for that transition  [facility] d/w ptn/niece, she is full code  DVT ppx w sc Lovenox

## 2019-11-12 ENCOUNTER — TRANSCRIPTION ENCOUNTER (OUTPATIENT)
Age: 79
End: 2019-11-12

## 2019-11-12 DIAGNOSIS — I10 ESSENTIAL (PRIMARY) HYPERTENSION: ICD-10-CM

## 2019-11-12 DIAGNOSIS — E03.9 HYPOTHYROIDISM, UNSPECIFIED: ICD-10-CM

## 2019-11-12 DIAGNOSIS — R62.7 ADULT FAILURE TO THRIVE: ICD-10-CM

## 2019-11-12 LAB
ANION GAP SERPL CALC-SCNC: 15 MMOL/L — SIGNIFICANT CHANGE UP (ref 5–17)
BUN SERPL-MCNC: 11 MG/DL — SIGNIFICANT CHANGE UP (ref 7–23)
CALCIUM SERPL-MCNC: 9.4 MG/DL — SIGNIFICANT CHANGE UP (ref 8.4–10.5)
CHLORIDE SERPL-SCNC: 100 MMOL/L — SIGNIFICANT CHANGE UP (ref 96–108)
CK SERPL-CCNC: 539 U/L — HIGH (ref 25–170)
CO2 SERPL-SCNC: 25 MMOL/L — SIGNIFICANT CHANGE UP (ref 22–31)
CREAT SERPL-MCNC: 0.8 MG/DL — SIGNIFICANT CHANGE UP (ref 0.5–1.3)
GLUCOSE SERPL-MCNC: 106 MG/DL — HIGH (ref 70–99)
HCT VFR BLD CALC: 36.9 % — SIGNIFICANT CHANGE UP (ref 34.5–45)
HGB BLD-MCNC: 12.3 G/DL — SIGNIFICANT CHANGE UP (ref 11.5–15.5)
MCHC RBC-ENTMCNC: 28.6 PG — SIGNIFICANT CHANGE UP (ref 27–34)
MCHC RBC-ENTMCNC: 33.3 GM/DL — SIGNIFICANT CHANGE UP (ref 32–36)
MCV RBC AUTO: 85.8 FL — SIGNIFICANT CHANGE UP (ref 80–100)
PLATELET # BLD AUTO: 241 K/UL — SIGNIFICANT CHANGE UP (ref 150–400)
POTASSIUM SERPL-MCNC: 3.3 MMOL/L — LOW (ref 3.5–5.3)
POTASSIUM SERPL-SCNC: 3.3 MMOL/L — LOW (ref 3.5–5.3)
RBC # BLD: 4.3 M/UL — SIGNIFICANT CHANGE UP (ref 3.8–5.2)
RBC # FLD: 13.6 % — SIGNIFICANT CHANGE UP (ref 10.3–14.5)
SODIUM SERPL-SCNC: 140 MMOL/L — SIGNIFICANT CHANGE UP (ref 135–145)
WBC # BLD: 10.58 K/UL — HIGH (ref 3.8–10.5)
WBC # FLD AUTO: 10.58 K/UL — HIGH (ref 3.8–10.5)

## 2019-11-12 PROCEDURE — 93306 TTE W/DOPPLER COMPLETE: CPT | Mod: 26

## 2019-11-12 RX ORDER — POTASSIUM CHLORIDE 20 MEQ
40 PACKET (EA) ORAL ONCE
Refills: 0 | Status: COMPLETED | OUTPATIENT
Start: 2019-11-12 | End: 2019-11-12

## 2019-11-12 RX ADMIN — ESCITALOPRAM OXALATE 20 MILLIGRAM(S): 10 TABLET, FILM COATED ORAL at 12:23

## 2019-11-12 RX ADMIN — Medication 81 MILLIGRAM(S): at 12:23

## 2019-11-12 RX ADMIN — Medication 40 MILLIEQUIVALENT(S): at 18:51

## 2019-11-12 RX ADMIN — AMLODIPINE BESYLATE 5 MILLIGRAM(S): 2.5 TABLET ORAL at 05:01

## 2019-11-12 RX ADMIN — SODIUM CHLORIDE 75 MILLILITER(S): 9 INJECTION INTRAMUSCULAR; INTRAVENOUS; SUBCUTANEOUS at 06:24

## 2019-11-12 RX ADMIN — SODIUM CHLORIDE 75 MILLILITER(S): 9 INJECTION INTRAMUSCULAR; INTRAVENOUS; SUBCUTANEOUS at 13:22

## 2019-11-12 RX ADMIN — CEFTRIAXONE 100 MILLIGRAM(S): 500 INJECTION, POWDER, FOR SOLUTION INTRAMUSCULAR; INTRAVENOUS at 13:22

## 2019-11-12 RX ADMIN — ENOXAPARIN SODIUM 30 MILLIGRAM(S): 100 INJECTION SUBCUTANEOUS at 12:23

## 2019-11-12 RX ADMIN — ATORVASTATIN CALCIUM 10 MILLIGRAM(S): 80 TABLET, FILM COATED ORAL at 21:27

## 2019-11-12 NOTE — PHYSICAL THERAPY INITIAL EVALUATION ADULT - GAIT DISTANCE, PT EVAL
25 feet/first 25 ft w/o device min unsteady intermittent scissoring vc to widen base of support min to cg of 1; amb with rolling walker 25 ft x2 when turning scissoring LOB x 2 min to cg of1 vc to widen arcadio

## 2019-11-12 NOTE — DISCHARGE NOTE NURSING/CASE MANAGEMENT/SOCIAL WORK - PATIENT PORTAL LINK FT
You can access the FollowMyHealth Patient Portal offered by [facility] by registering at the following website: [email or web address]. By joining [employer]’s FollowMyHealth portal, you will also be able to view your health information using other applications (apps) compatible with our system.

## 2019-11-12 NOTE — PHYSICAL THERAPY INITIAL EVALUATION ADULT - PLANNED THERAPY INTERVENTIONS, PT EVAL
transfer training/stair train GOAL : pt will negotiate 6 steps with rail with supervision in 2-3 weeks/balance training/bed mobility training/gait training/strengthening

## 2019-11-12 NOTE — PROGRESS NOTE ADULT - SUBJECTIVE AND OBJECTIVE BOX
[facility]      Seen earlier today, and examined.  - Today, patient is without complaints.           *****MEDICATIONS: Current medication reviewed and documented.    MEDICATIONS  (STANDING):  amLODIPine   Tablet 5 milliGRAM(s) Oral daily  aspirin enteric coated 81 milliGRAM(s) Oral daily  atorvastatin 10 milliGRAM(s) Oral at bedtime  cefTRIAXone   IVPB      cefTRIAXone   IVPB 1000 milliGRAM(s) IV Intermittent every 24 hours  enoxaparin Injectable 30 milliGRAM(s) SubCutaneous daily  escitalopram 20 milliGRAM(s) Oral daily  sodium chloride 0.9%. 1000 milliLiter(s) (75 mL/Hr) IV Continuous <Continuous>    MEDICATIONS  (PRN):          ***** VITAL SIGNS:  T(F): 97.2 (19 @ 12:20), Max: 97.8 (19 @ 21:25)  HR: 70 (19 @ 12:20) (70 - 79)  BP: 122/70 (19 @ 12:20) (122/70 - 150/76)  RR: 18 (19 @ 12:20) (18 - 18)  SpO2: 98% (19 @ 12:20) (95% - 98%)  Wt(kg): --  ,   I&O's Summary    2019 07:  -  2019 07:00  --------------------------------------------------------  IN: 1500 mL / OUT: 0 mL / NET: 1500 mL    2019 07:  -  2019 14:57  --------------------------------------------------------  IN: 480 mL / OUT: 0 mL / NET: 480 mL             *****PHYSICAL EXAM:  Alert oriented x 3   Attention comprehension are fair. Able to name, repeat, read without any difficulty.   Able to follow 3 step commands.     EOM limited upgaze did not appreciate fatiguing with upgaze    VFF to confrontration   upgaze with nystagmus     No facial asymmetry   dysarthria    hoarse voice   Tongue is midline, atrophy of tongue   Palate elevates symmetrically   Moving all 4 ext symmetrically no pronator drift   Reflexes are 2+ throughout   no fasciculations of the muscles noted.   sensation is grossly symmetric  Gait : not assessed.  B/L down going toes      no clonus        *****LAB AND IMAGIN.3   10.58 )-----------( 241      ( 2019 09:38 )             36.9               -12    140  |  100  |  11  ----------------------------<  106<H>  3.3<L>   |  25  |  0.80    Ca    9.4      2019 07:22  Phos  2.9     -10  Mg     1.9     11-10    TPro  8.2  /  Alb  4.2  /  TBili  0.6  /  DBili  x   /  AST  44<H>  /  ALT  28  /  AlkPhos  72  11-10           CARDIAC MARKERS ( 2019 07:22 )  x     / x     / 539 U/L / x     / x      CARDIAC MARKERS ( 2019 06:26 )  x     / x     / 1235 U/L / x     / x      CARDIAC MARKERS ( 10 Nov 2019 18:44 )  x     / x     / 1028 U/L / x     / x                  Urinalysis Basic - ( 10 Nov 2019 21:51 )    Color: Light Yellow / Appearance: Slightly Turbid / S.036 / pH: x  Gluc: x / Ketone: Large  / Bili: Negative / Urobili: Negative   Blood: x / Protein: 30 mg/dL / Nitrite: Negative   Leuk Esterase: Moderate / RBC: 3 /hpf / WBC 5 /HPF   Sq Epi: x / Non Sq Epi: 1 / Bacteria: Negative      [All pertinent recent Imaging/Reports reviewed]    MR< from: MR Angio Neck No Cont (19 @ 19:14) >  Redemonstration of volume loss, microvascular disease, no obvious   restricted diffusion, hemorrhage or midline shift.    Calcification and mild stenosis of the cavernous and super clinoid ICA   segments.Patent proximal and middle cerebral arteries, fetal PCAs, with   stenosis of the distal anterior, middle, and posterior cerebral artery   branch vessels.    Dominant anterior circulation, with hypoplastic poorly seen posterior   circulation, with a dominant intradural right vertebral artery, and   poorly seen intradural left vertebral artery with possible stenoses   proximal to the vertebrobasilar junction.    Motion limited assessment of extra cranial circulation, patent bilateral   common and internal carotid arteries, dominant right and hypoplastic left   vertebral artery limited assessment of the ICA origins, suggest repeat   imaging when patient clinically able.        < end of copied text >         *****A S S E S S M E N T   A N D   P L A N :         80 y/o woman with PMHx HTN, forgetfulness but still drives, hypothyroid but not on synthroid BIBEMS for unwitnessed fall. Per niece and sister at bedside, patient lives alone, and for the last "couple" of months developed dysarthria and had been getting progressively more forgetful for the last 6 months or so. Ptns house is unkept and for the past 2 months ptn has been unkept. Ptn has been resistant to see a doctor. Never evaluated for stroke, never evaluated for dementia. She does not have any children and she is , and does not have assistance with ADLs. Sister is next of kin. But while ptn is being evaluated she named her niece her HCP.   Last evening, patient called niece's residence. She is unaccounted for until this AM around 10, when sister called her about [employer] and learned she had fallen down. A neighbor called 911. Patient has no memory of what happened. Fall was unwitnessed, unknown if there was LOC. Denies pain, however, poor historian. Ptn has no insight into her progressive worsening function.  No fever, chills, chest pain, SOB, n/v/d. Unclear if patient has been able to take medications as prescribed.       Problem/Recommendations 1: dysarthia, imbalance with progressive decline over the last 6 mos.   also noted on exam tongue atrophy, possible tongue fasiculations.   difficulty with upgaze, hoarseness.      DDx: neurodegenerative process such as  progressive supranuclear palsy /opcd vs  neuromuscular/motor neuron disease    mri brain without acute process.   mra head  will get acetylcholine receptor antibody/ anti musk antibodies.   emg/ncv will need to be arranged  elevated CPK likely rhabdo  s/s eval as pt seems to be having difficulty with food bolus   unable to tolerate ENT eval as pt was not cooperative.  discussed at length with niece regarding findings.  LP may be helpful however, pt refused and niece also was in agreement with pt.   She is focusing on her comfort. She wants to place her in a facility..      Problem/Recommendations 2: rhabdomyolysis improving    CPK trending down.          Thank you for allowing me to participate in the care of this patient. Please do not hesitate to call me if you have any  questions.        ________________  Wendy Joyce MD  [facility] (PN)[facility]  [phone]      33 minutes spent on total encounter; more than 50 % of the visit was  spent counseling about plan of care, compliance to diet/exercise and medication regimen and or  coordinating care by the attending physician.      It is advised that stroke patients follow up with VINH Lindsay @ [phone] in 1- 2 weeks.   Others please follow up with Dr. Michael Nissenbaum [phone]

## 2019-11-12 NOTE — CHART NOTE - NSCHARTNOTEFT_GEN_A_CORE
Upon Nutritional Assessment by the Registered Dietitian your patient was determined to meet criteria / has evidence of the following diagnosis/diagnoses:          [ ]  Mild Protein Calorie Malnutrition        [ ]  Moderate Protein Calorie Malnutrition        [x] Severe Protein Calorie Malnutrition        [ ] Unspecified Protein Calorie Malnutrition        [ ] Underweight / BMI <19        [ ] Morbid Obesity / BMI > 40      Findings as based on:  [x] Comprehensive nutrition assessment   [ ] Nutrition Focused Physical Exam  [x] Other: 19.4% weight loss in a few months, possible swallowing difficulties with tongue atrophy      Nutrition Plan/Recommendations:    1) Pending speech and swallow evaluation for diet consistency.  2) Continue DASH diet  3) Recommend Mighty Shake twice a day  4) Recommend provide assistance with picking menu items.     PROVIDER Section:     By signing this assessment you are acknowledging and agree with the diagnosis/diagnoses assigned by the Registered Dietitian    Comments:

## 2019-11-12 NOTE — CONSULT NOTE ADULT - SUBJECTIVE AND OBJECTIVE BOX
Patient is a 79y old  Female who presents with a chief complaint of slurred speech, frequent falls (2019 12:48)      HPI:    78 y/o woman with PMHx HTN, forgetfulness but still drives, hypothyroid but not on synthroid BIBEMS for unwitnessed fall. Per niece and sister at bedside, patient lives alone, and for the last "couple" of months developed dysarthria and had been getting progressively more forgetful for the last 6 months or so. Ptns house is unkept and for the past 2 months ptn has been unkept. Ptn has been resistant to see a doctor. Never evaluated for stroke, never evaluated for dementia. She does not have any children and she is , and does not have assistance with ADLs. Sister is next of kin. But while ptn is being evaluated she named her niece her HCP.   Last evening, patient called niece's residence. She is unaccounted for until this AM around 10, when sister called her about [employer] and learned she had fallen down. A neighbor called 911. Patient has no memory of what happened. Fall was unwitnessed, unknown if there was LOC. Denies pain, however, poor historian. Ptn has no insight into her progressive worsening function.  No fever, chills, chest pain, SOB, n/v/d. Unclear if patient has been able to take medications as prescribed. (10 Nov 2019 21:38)    ER vss, afebrile.  WBC 16.0 --> 10.5.  UA (+) mod LE, sm blood, 5 WBCs.  UCx contaminated.  CT c/a/p without traumatic injury.  CT cervical spine/Head WNL.   Pt on rocephin for UTI.  ID consult called for further abx management.       REVIEW OF SYSTEMS:    CONSTITUTIONAL: No fever, weight loss, or fatigue  EYES: No eye pain, visual disturbances, or discharge  ENMT:  No sore throat  NECK: No pain or stiffness  RESPIRATORY: No cough, wheezing, chills or hemoptysis; No shortness of breath  CARDIOVASCULAR: No chest pain, palpitations, dizziness, or leg swelling  GASTROINTESTINAL: No abdominal or epigastric pain. No nausea, vomiting, or hematemesis; No diarrhea or constipation. No melena or hematochezia.  GENITOURINARY: No dysuria, frequency, hematuria, or incontinence  NEUROLOGICAL: No headaches, memory loss, loss of strength, numbness, or tremors  SKIN: No itching, burning, rashes, or lesions   LYMPH NODES: No enlarged glands  MUSCULOSKELETAL: No joint pain or swelling; No muscle, back, or extremity pain      PAST MEDICAL & SURGICAL HISTORY:  Diverticulosis  Depression  Hypertension, unspecified type  Hypothyroidism, unspecified type  History of cholecystectomy      Allergies    No Known Allergies    Intolerances        FAMILY HISTORY:  No pertinent family history in first degree relatives      SOCIAL HISTORY:        MEDICATIONS  (STANDING):  amLODIPine   Tablet 5 milliGRAM(s) Oral daily  aspirin enteric coated 81 milliGRAM(s) Oral daily  atorvastatin 10 milliGRAM(s) Oral at bedtime  cefTRIAXone   IVPB      cefTRIAXone   IVPB 1000 milliGRAM(s) IV Intermittent every 24 hours  enoxaparin Injectable 30 milliGRAM(s) SubCutaneous daily  escitalopram 20 milliGRAM(s) Oral daily  sodium chloride 0.9%. 1000 milliLiter(s) (75 mL/Hr) IV Continuous <Continuous>    MEDICATIONS  (PRN):      Vital Signs Last 24 Hrs  T(C): 36.2 (2019 12:20), Max: 36.6 (2019 21:25)  T(F): 97.2 (2019 12:20), Max: 97.8 (2019 21:25)  HR: 70 (2019 12:20) (70 - 79)  BP: 122/70 (2019 12:20) (122/70 - 150/76)  BP(mean): --  RR: 18 (2019 12:20) (18 - 18)  SpO2: 98% (2019 12:20) (95% - 98%)    PHYSICAL EXAM:    GENERAL: NAD, well-groomed  HEAD:  Atraumatic, Normocephalic  EYES: EOMI, PERRLA, conjunctiva and sclera clear  ENMT: No tonsillar erythema, exudates, or enlargement; Moist mucous membranes  NECK: Supple, No JVD  CHEST/LUNG: Clear to percussion bilaterally; No rales, rhonchi, wheezing, or rubs  HEART: Regular rate and rhythm; No murmurs, rubs, or gallops  ABDOMEN: Soft, Nontender, Nondistended; Bowel sounds present  EXTREMITIES:  2+ Peripheral Pulses, No clubbing, cyanosis, or edema  LYMPH: No lymphadenopathy noted  SKIN: No rashes or lesions    LABS:  CBC Full  -  ( 2019 09:38 )  WBC Count : 10.58 K/uL  RBC Count : 4.30 M/uL  Hemoglobin : 12.3 g/dL  Hematocrit : 36.9 %  Platelet Count - Automated : 241 K/uL  Mean Cell Volume : 85.8 fl  Mean Cell Hemoglobin : 28.6 pg  Mean Cell Hemoglobin Concentration : 33.3 gm/dL  Auto Neutrophil # : x  Auto Lymphocyte # : x  Auto Monocyte # : x  Auto Eosinophil # : x  Auto Basophil # : x  Auto Neutrophil % : x  Auto Lymphocyte % : x  Auto Monocyte % : x  Auto Eosinophil % : x  Auto Basophil % : x      11-12    140  |  100  |  11  ----------------------------<  106<H>  3.3<L>   |  25  |  0.80    Ca    9.4      2019 07:22  Phos  2.9     11-10  Mg     1.9     11-10    TPro  8.2  /  Alb  4.2  /  TBili  0.6  /  DBili  x   /  AST  44<H>  /  ALT  28  /  AlkPhos  72  11-10      LIVER FUNCTIONS - ( 10 Nov 2019 18:44 )  Alb: 4.2 g/dL / Pro: 8.2 g/dL / ALK PHOS: 72 U/L / ALT: 28 U/L / AST: 44 U/L / GGT: x                               MICROBIOLOGY:        Urinalysis Basic - ( 10 Nov 2019 21:51 )    Color: Light Yellow / Appearance: Slightly Turbid / S.036 / pH: x  Gluc: x / Ketone: Large  / Bili: Negative / Urobili: Negative   Blood: x / Protein: 30 mg/dL / Nitrite: Negative   Leuk Esterase: Moderate / RBC: 3 /hpf / WBC 5 /HPF   Sq Epi: x / Non Sq Epi: 1 / Bacteria: Negative                RADIOLOGY:

## 2019-11-12 NOTE — DISCHARGE NOTE NURSING/CASE MANAGEMENT/SOCIAL WORK - NSDCPEPTSTRK_GEN_ALL_CORE
Prescribed medications/Risk factors for stroke/Stroke support groups for patients, families, and friends/Stroke warning signs and symptoms/Signs and symptoms of stroke/Need for follow up after discharge/Stroke education booklet/Call 911 for stroke

## 2019-11-12 NOTE — PROGRESS NOTE ADULT - SUBJECTIVE AND OBJECTIVE BOX
Subjective: Patient seen and examined. No new events except as noted.   resting comfortably       REVIEW OF SYSTEMS:    CONSTITUTIONAL:+ weakness, fevers or chills  EYES/ENT: No visual changes;  No vertigo or throat pain   NECK: No pain or stiffness  RESPIRATORY: No cough, wheezing, hemoptysis; No shortness of breath  CARDIOVASCULAR: No chest pain or palpitations  GASTROINTESTINAL: No abdominal or epigastric pain. No nausea, vomiting, or hematemesis; No diarrhea or constipation. No melena or hematochezia.  GENITOURINARY: No dysuria, frequency or hematuria  NEUROLOGICAL: No numbness or weakness  SKIN: No itching, burning, rashes, or lesions   All other review of systems is negative unless indicated above.    MEDICATIONS:  MEDICATIONS  (STANDING):  amLODIPine   Tablet 5 milliGRAM(s) Oral daily  aspirin enteric coated 81 milliGRAM(s) Oral daily  atorvastatin 10 milliGRAM(s) Oral at bedtime  cefTRIAXone   IVPB      cefTRIAXone   IVPB 1000 milliGRAM(s) IV Intermittent every 24 hours  enoxaparin Injectable 30 milliGRAM(s) SubCutaneous daily  escitalopram 20 milliGRAM(s) Oral daily  sodium chloride 0.9%. 1000 milliLiter(s) (75 mL/Hr) IV Continuous <Continuous>      PHYSICAL EXAM:  T(C): 36.4 (11-12-19 @ 04:57), Max: 36.6 (11-11-19 @ 21:25)  HR: 78 (11-12-19 @ 11:35) (77 - 79)  BP: 148/70 (11-12-19 @ 11:35) (130/76 - 150/76)  RR: 18 (11-12-19 @ 11:35) (18 - 18)  SpO2: 97% (11-12-19 @ 11:35) (95% - 97%)  Wt(kg): --  I&O's Summary    11 Nov 2019 07:01  -  12 Nov 2019 07:00  --------------------------------------------------------  IN: 1500 mL / OUT: 0 mL / NET: 1500 mL          Appearance: NAD  HEENT:   Dry oral mucosa, PERRL, EOMI	  Lymphatic: No lymphadenopathy , no edema  Cardiovascular: Normal S1 S2, No JVD, No murmurs , Peripheral pulses palpable 2+ bilaterally  Respiratory: Lungs clear to auscultation, normal effort 	  Gastrointestinal:  Soft, Non-tender, + BS	  Skin: No rashes, No ecchymoses, No cyanosis, warm to touch  Musculoskeletal: Normal range of motion, normal strength  Psychiatry:  Sleepy   Ext: No edema      LABS:    CARDIAC MARKERS:  CARDIAC MARKERS ( 12 Nov 2019 07:22 )  x     / x     / 539 U/L / x     / x      CARDIAC MARKERS ( 11 Nov 2019 06:26 )  x     / x     / 1235 U/L / x     / x      CARDIAC MARKERS ( 10 Nov 2019 18:44 )  x     / x     / 1028 U/L / x     / x                                    12.3   10.58 )-----------( 241      ( 12 Nov 2019 09:38 )             36.9     11-12    140  |  100  |  11  ----------------------------<  106<H>  3.3<L>   |  25  |  0.80    Ca    9.4      12 Nov 2019 07:22  Phos  2.9     11-10  Mg     1.9     11-10    TPro  8.2  /  Alb  4.2  /  TBili  0.6  /  DBili  x   /  AST  44<H>  /  ALT  28  /  AlkPhos  72  11-10    proBNP:   Lipid Profile:   HgA1c:   TSH:     4          TELEMETRY: 	    ECG:  	  RADIOLOGY: < from: MR Angio Neck No Cont (11.11.19 @ 19:14) >    EXAM:  MR ANGIO BRAIN                          EXAM:  MR ANGIO NECK                          EXAM:  MR BRAIN                            PROCEDURE DATE:  11/11/2019            INTERPRETATION:  CLINICAL INDICATION: Rule out stenosis, new slurred   speech, adult, there are 2 thrive,    Technique: Conchal brain MRI, non-contrast brain MRA and  neck MRA was   performed.      Through the brain, sagittal and axial T1, axial T2, FLAIR, diffusion   weighted images and an ADC map were obtained. Axial T1 post-contrast   images were obtained and reconstructed in sagittal and coronal planes.     MR angiography of intracranial and extracranial circulation was performed   with time of flight imaging technique. Maximal intensity projection   images were reviewed in multiple planes.    COMPARISON:    Ultrasound carotid Doppler 11/11/2019, head & cervical spine CT   11/10/2019, MRI thoracic & lumbar spine, 5/8/1930    FINDINGS:  Brain MRI:    There is mild to moderate enlargement of ventricles and sulci consistent   with volume loss. There are foci of T2/FLAIR signal hyperintensity within   the hemispheric white matter, which are nonspecific but likely related to   sequelae of microvascular disease. There is no intraparenchymal hematoma,   mass effect or midline shift. Small mm bifrontal subdural hygromas with   extension toward the vertex. There is susceptibility, likely   mineralization/calcification of the globus pallidus, substantia nigra,   and red nuclei. There is hemosiderin staining at the basal cisterns. No   areas of abnormal restrictive diffusion are present to suggest acute or   subacute infarction. No abnormal extra-axial fluid collections are   present. The basal cisterns are patent.    The calvarium is intact. Orbits and tympanomastoid cavities appear free   of acute disease. There is mucosal thickening, retention cysts and polyps   within the right maxillary sinus, with slight mucosal thickening the   ethmoid, frontal, and sphenoid sinuses. There are no air-fluid levels.    Brain MRA:      Motion limited study, patent flow related signal in the internal carotid   arteries in the distal cervical, petrous and cavernous portions, slight   stenosis secondary to atherosclerotic calcification at cavernous and   supraclinoid ICA segments. Bifurcation segments, proximal A1, anterior   communicating, and M1 segments are patent, there is a decrease in size   number of the distal bilateral M2 and M3 MCA branches with multifocal   stenoses stenoses of distal JUSTA branch vessels.    There are fetal origins of the bilateral posterior cerebral arteries with   hypoplastic P1 arterial segments, likely developmental anatomic   variation. The posterior circulation is hypoplastic, dominant intradural   right vertebral artery, hypoplastic intradural left vertebral artery   which becomes hypoplastic after a left PICA origin with poorly seen   hypoplastic vessel extending to the vertebrobasilar junction, stenosis is   not excluded.. Tortuous proximal basilar artery, proximal basilar artery   is poorly delineated, improved delineation of the distal segment, and   improved delineation of the T2 flow void on axial T2 MR sequences of the   brain. Slightly bulbous basilar tip with possible epididymal at the P1   segment origin, anddistal PCA branch stenoses.      Neck MRA:  Motion limited study, limited visualization of the aortic arch, great   vessel origins, patent flow voids are noted along the bilateral common   and internal carotid arteries, there does not appear to be any   significant hemodynamic symmetric stenosis at the ICA origins although   study is severely limited by motion technique, dominant right and poorly   seen intradural left vertebral artery secondary to motion, suggest repeat   imaging when patient clinically able to hold still.    IMPRESSION:    Redemonstration of volume loss, microvascular disease, no obvious   restricted diffusion, hemorrhage or midline shift.    Calcification and mild stenosis of the cavernous and super clinoid ICA   segments.Patent proximal and middle cerebral arteries, fetal PCAs, with   stenosis of the distal anterior, middle, and posterior cerebral artery   branch vessels.    Dominant anterior circulation, with hypoplastic poorly seen posterior   circulation, with a dominant intradural right vertebral artery, and   poorly seen intradural left vertebral artery with possible stenoses   proximal to the vertebrobasilar junction.    Motion limited assessment of extra cranial circulation, patent bilateral   common and internal carotid arteries, dominant right and hypoplastic left   vertebral artery limited assessment of the ICA origins, suggest repeat   imaging when patient clinically able.                      DMITRY RICHARDSON M.D., ATTENDING RADIOLOGIST  This document has been electronically signed. Nov 12 2019  7:31AM                < end of copied text >    DIAGNOSTIC TESTING:  [ ] Echocardiogram:  [ ]  Catheterization:  [ ] Stress Test:    OTHER:

## 2019-11-12 NOTE — PHYSICAL THERAPY INITIAL EVALUATION ADULT - IMPAIRMENTS FOUND, PT EVAL
speech slurred ; forgetful at times/gait, locomotion, and balance/aerobic capacity/endurance/muscle strength

## 2019-11-12 NOTE — CONSULT NOTE ADULT - REASON FOR ADMISSION
slurred speech, frequent falls

## 2019-11-12 NOTE — DIETITIAN INITIAL EVALUATION ADULT. - OTHER INFO
Pt admitted on 11/10. Pt states that her appetite and PO intake was good PTA. Pt states that she lives at home and does the shopping/cooking. Pt reports not following any specific diet at home, and is eating three well balanced meals a day. Pt believes that she has been gaining weight over the last few years as a result of lack of physical activity. Pt reports a UBW of 180 pounds as of a few months ago. Pt denies any chewing/swallowing issues with no nausea, vomiting, constipation or diarrhea. Pt confirms NKFA. Pt takes Centrum and Vitamin A and D daily. Pt states a height of 61 inches.    Wt: Dosing wt of 145 pounds as per Clinical Summary. UBW of 180 pounds as per patient. Pt believes that dosing weight is way too low, spoke with RN about getting another weight to trend.     Pt states that her PO intake and appetite has been good since admission. As per pt and EMR, pt is eating >75% of her meals. Pt denies any nausea, vomiting, constipation or diarrhea. As per neurology on 11/11, pt with possible tongue atrophy and difficulty swallowing food bolus. However as per pt, she does not have any swallowing issues. As per RN, pt has been able to tolerate her current DASH regular consistency diet well with no aspiration. Pt and flowsheet report last BM of 11/11. Awaiting a daily weight to assess if any recommendations are warranted. Pt admitted on 11/10. Pt states that her appetite and PO intake was good PTA. Pt states that she lives at home and does the shopping/cooking. Pt reports not following any specific diet at home, and is eating three well balanced meals a day. Pt believes that she has been gaining weight over the last few years as a result of lack of physical activity. Pt reports a UBW of 180 pounds as of a few months ago. Pt denies any chewing/swallowing issues with no nausea, vomiting, constipation or diarrhea. Pt confirms NKFA. Pt takes Centrum and Vitamin A and D daily. Pt states a height of 61 inches.    Wt: Dosing wt of 145 pounds as per Clinical Summary. UBW of 180 pounds as per patient. Pt believes that dosing weight is way too low, spoke with RN about getting another weight to trend.     Pt states that her PO intake and appetite has been good since admission. As per pt and EMR, pt is eating >75% of her meals. Pt denies any nausea, vomiting, constipation or diarrhea. As per neurology on 11/11, pt with possible tongue atrophy and difficulty swallowing food bolus. However as per pt, she does not have any swallowing issues. As per RN, pt has been able to tolerate her current DASH regular consistency diet well with no aspiration. Pt is awaiting a bedside speech and swallow evaluation. Pt and flowsheet report last BM of 11/11. Awaiting a daily weight to assess if any recommendations are warranted. Pt admitted on 11/10. Although pt states that her appetite and PO intake was good PTA, pt was noted with slurred speech and seemed to be confused. Pt states that she lives at home and does the shopping/cooking. Pt reports not following any specific diet at home, and states she is eating three well balanced meals a day. However, pt is not able to give specific meal recall with portion size. Pt noted with progressively worsening memory. Pt reports a UBW of 180 pounds as of a few months agoPt believes that she has been gaining weight over the last few years as a result of lack of physical activity, however weight trends indicate weight loss. Pt denies any chewing/swallowing issues with no nausea, vomiting, constipation or diarrhea. Pt confirms NKFA. Pt takes Centrum and Vitamin A and D daily. Pt states a height of 61 inches.    Wt: Dosing wt of 145 pounds as per Clinical Summary. UBW of 180 pounds as per patient. Pt believes that dosing weight is way too low, spoke with RN about getting another weight to trend. However question accuracy of patient's recall as she is noted with confusion.     Per MD, admitting diagnoses Failure to Thrive. Pt states that her PO intake and appetite has been good since admission. As per pt and EMR, pt is eating >75% of her meals. Pt denies any nausea, vomiting, constipation or diarrhea. Review of lunch tray shows <50% of intake, however unsure if pt stopped eating for IV placement. As per neurology on 11/11, pt with possible tongue atrophy and difficulty swallowing food bolus. Pt states she does not have any swallowing issues, however does have a slur indicating possible swallowing issues. As per RN, pt has been able to tolerate her current DASH regular consistency diet well with no aspiration. Pt is awaiting a bedside speech and swallow for further evaluation. Pt and flowsheet report last BM of 11/11. Awaiting a daily weight to assess if any recommendations are warranted. Pt admitted on 11/10. Although pt states that her appetite and PO intake was good PTA, pt was noted with confusion and progressively worsening memory. Pt states that she lives at home and does the shopping/cooking. Pt reports not following any specific diet at home, and states she is eating three well balanced meals a day. However, pt is not able to give specific meal recall with portion sizes. Pt reports a UBW of 180 pounds as of a few months ago. Pt believes that she has been gaining weight over the last few years as a result of lack of physical activity, however weight trends show weight loss. Pt denies any chewing/swallowing issues with no nausea, vomiting, constipation or diarrhea. Pt confirms NKFA. Pt takes Centrum and Vitamin A and D daily. Pt states a height of 61 inches.    Wt: Dosing wt of 145 pounds as per Clinical Summary. UBW of 180 pounds as per patient. Pt believes that dosing weight is way too low, spoke with RN about getting another weight to trend. However question accuracy of patient's wt recall as she is noted with confusion.     Per MD, admitting diagnoses Failure to Thrive. Pt states that her PO intake and appetite has been good since admission. As per pt and EMR, pt is eating >75% of her meals. Pt denies any nausea, vomiting, constipation or diarrhea. Review of lunch tray shows <50% of intake, however unsure if pt stopped eating for IV placement. As per neurology on 11/11, pt with possible tongue atrophy and difficulty swallowing food bolus. Pt states she does not have any swallowing issues, however does have a slur indicating possible swallowing issues. As per RN, pt has been able to tolerate her current DASH regular consistency diet well with no aspiration. Pt is awaiting a bedside speech and swallow for further evaluation. Pt and flowsheet report last BM of 11/11. Awaiting a daily weight to assess if any recommendations are warranted.

## 2019-11-12 NOTE — PHYSICAL THERAPY INITIAL EVALUATION ADULT - ADDITIONAL COMMENTS
pt lives alone in house sister is next of Kin , Niece is HCP ; pt becoming more forgetful per chart per niece and sister and unkept home and pt unkept pt lives alone in house sister is next of Kin , Niece is HCP ; pt becoming more forgetful per chart per niece and sister and unkept home and pt unkept  NOTE : CT ABD/PELVIS/ HEAD/CERVICAL SPINE : (-) for acute pathology or event pt lives alone in house sister is next of Kin , Niece is HCP ; pt becoming more forgetful per chart per niece and sister and unkept home and pt unkept; pt has rolling walker and std cane that was her spouses , she does not use ; pt has walk-in shower with grab bars no seat   NOTE : CT ABD/PELVIS/ HEAD/CERVICAL SPINE : (-) for acute pathology or event

## 2019-11-12 NOTE — PHYSICAL THERAPY INITIAL EVALUATION ADULT - GENERAL OBSERVATIONS, REHAB EVAL
pt received in b/s chair + chair alarm active , tabel in front , call bell and phone in reach ; pt speech slurred per pt has been like this since admit to hospital , pt knows she fell but not specifics of fall or how long on floor

## 2019-11-12 NOTE — PHYSICAL THERAPY INITIAL EVALUATION ADULT - IMPAIRED TRANSFERS: SIT/STAND, REHAB EVAL
cognition/impaired balance/decreased strength/impaired postural control/decreased endurance , sit-stand x2 once w/o device min to cg of1 mild unsteady , at rolling walker cg of1 vc for proper hand placement mild unsteady

## 2019-11-12 NOTE — CONSULT NOTE ADULT - ASSESSMENT
80 y/o woman with PMHx HTN, forgetfulness but still drives, hypothyroid but not on synthroid BIBEMS for unwitnessed fall. Per niece and sister at bedside, patient lives alone, and for the last "couple" of months developed dysarthria and had been getting progressively more forgetful for the last 6 months or so. Ptns house is unkept and for the past 2 months ptn has been unkept. Ptn has been resistant to see a doctor. Never evaluated for stroke, never evaluated for dementia. She does not have any children and she is , and does not have assistance with ADLs. Sister is next of kin. But while ptn is being evaluated she named her niece her HCP.   Last evening, patient called niece's residence. She is unaccounted for until this AM around 10, when sister called her about [employer] and learned she had fallen down. A neighbor called 911. Patient has no memory of what happened. Fall was unwitnessed, unknown if there was LOC. Denies pain, however, poor historian. Ptn has no insight into her progressive worsening function.  No fever, chills, chest pain, SOB, n/v/d. Unclear if patient has been able to take medications as prescribed. (10 Nov 2019 21:38)    ER vss, afebrile.  WBC 16.0 --> 10.5.  UA (+) mod LE, sm blood, 5 WBCs.  UCx contaminated.  CT c/a/p without traumatic injury.  CT cervical spine/Head WNL.   Pt on rocephin for UTI.  ID consult called for further abx management.       UTI:    - Pt p/w unwitnessed fall.  Found to have leukocytosis, UA with LE, sm blood.  Pt poor historian, currently denies urinary symptoms.  Reasonable to treat in elderly patient given clinical history.      - No culture data available, ucx contaminated.  WBC improving.  Recommend d/c rocephin after 3 day course for uncomplicated UTI.      - Monitor WBC and temp curve.  CT a/p no infectious focus.       Will follow,    Mariya Esposito  [phone]

## 2019-11-12 NOTE — PHYSICAL THERAPY INITIAL EVALUATION ADULT - IMPAIRMENTS CONTRIBUTING TO GAIT DEVIATIONS, PT EVAL
decreased strength/impaired balance/narrow base of support/impaired postural control/decreased endurance , see gait comments above

## 2019-11-12 NOTE — DIETITIAN INITIAL EVALUATION ADULT. - PHYSICAL APPEARANCE
other (specify) Ht: 61 inches  Wt: Dosing 145 pounds BMI: 27.4 kg/m2  IBW: 105 pounds IBW%: 138%  Skin as per nursing documentation: IAD on perineum. Wound to left elbow is dry and intact. +1 R arm/elbow edema noted.

## 2019-11-12 NOTE — PHYSICAL THERAPY INITIAL EVALUATION ADULT - DISCHARGE DISPOSITION, PT EVAL
rehabilitation facility/PT Recommend Subacute rehab ; if pt and family decide to take pt home pt needs assist all functional activity and adl's ;pt understood and report may want to transition to snf

## 2019-11-12 NOTE — PROGRESS NOTE ADULT - ASSESSMENT
80 yo woman w progressive worsening in function w memory loss, frequent falls, dysarthria, house unkept, not clear if taking her meds, ptn is unkept, has multiple echymoses  PLAN:   MRI/MRA H/N not suggestive of recent CVA, ptn AND HCP refuse LP   Neuro consult and input aprpeciated  dysarthria, improved, prob post Rx for UTI  questionable dysphagia, awaiting S&S eval   ptn refused  ENT consult to R/O vocal cord paralysis  Carotid duplex is benign  get LE Duplex  Rhabdomyolysis is omproving, CPK dropping  cont hydration w NS, trend CPK  nl TSH, Vit B12, Folate, neg RPR  UA c/w UTI, urine culture is  polymicrobial, on CTX day 2/5  EKG w LVH and possible lateral infarct, TTE pending  BP controlled on Norvasc 5 mg daily  PT eval done, recommend MARTHA Parks is now her HCP form on the chart,  she has looked at multiple assisted living and it appears ptn is ready for that transition  GOC d/w ptn/niece, she is full code  DVT ppx w sc Lovenox

## 2019-11-12 NOTE — PHYSICAL THERAPY INITIAL EVALUATION ADULT - PERTINENT HX OF CURRENT PROBLEM, REHAB EVAL
MR Angio HEAD/ NECK, MRI BRAIN 11/11/19 :Redemonstration of volume loss, microvascular disease, no obvious restricted diffusion, hemorrhage or midline shift. MR Angio HEAD/ NECK, MRI BRAIN 11/11/19 :Re-demonstration of volume loss, microvascular disease, no obvious restricted diffusion, hemorrhage or midline shift.

## 2019-11-12 NOTE — PROGRESS NOTE ADULT - SUBJECTIVE AND OBJECTIVE BOX
Patient is a 79y old  Female who presents with a chief complaint of slurred speech, frequent falls (2019 15:00)      SUBJECTIVE / OVERNIGHT EVENTS: dysarthria a bit better today, refusing ENT evaluation, awaiting S&S evaluation, refusing any further neurology work up: refusing LP. consents to go to [facility] and then assisted living. ptn may have progressive debilitating neuro dz, ptn and family aware. GOC d/w ptn/family x 30 min. Still no code decision. awaiting blood work up for Myasthenia Gravis . DAY 2 ABx for UTI, uncomplicated    MEDICATIONS  (STANDING):  amLODIPine   Tablet 5 milliGRAM(s) Oral daily  aspirin enteric coated 81 milliGRAM(s) Oral daily  atorvastatin 10 milliGRAM(s) Oral at bedtime  cefTRIAXone   IVPB      cefTRIAXone   IVPB 1000 milliGRAM(s) IV Intermittent every 24 hours  enoxaparin Injectable 30 milliGRAM(s) SubCutaneous daily  escitalopram 20 milliGRAM(s) Oral daily  sodium chloride 0.9%. 1000 milliLiter(s) (75 mL/Hr) IV Continuous <Continuous>    MEDICATIONS  (PRN):      Vital Signs Last 24 Hrs  T(F): 97.2 (19 @ 12:20), Max: 97.8 (19 @ 21:25)  HR: 70 (19 @ 12:20) (70 - 79)  BP: 122/70 (19 @ 12:20) (122/70 - 150/76)  RR: 18 (19 @ 12:20) (18 - 18)  SpO2: 98% (19 @ 12:20) (95% - 98%)  Telemetry:   CAPILLARY BLOOD GLUCOSE        I&O's Summary    2019 07:  -  2019 07:00  --------------------------------------------------------  IN: 1500 mL / OUT: 0 mL / NET: 1500 mL    2019 07:  -  2019 16:11  --------------------------------------------------------  IN: 480 mL / OUT: 0 mL / NET: 480 mL        PHYSICAL EXAM:  GENERAL: NAD, well-developed  HEAD:  Atraumatic, Normocephalic  EYES: EOMI, PERRLA, conjunctiva and sclera clear  NECK: Supple, No JVD  CHEST/LUNG: Clear to auscultation bilaterally; No wheeze  HEART: Regular rate and rhythm; No murmurs, rubs, or gallops  ABDOMEN: Soft, Nontender, Nondistended; Bowel sounds present  EXTREMITIES:  2+ Peripheral Pulses, No clubbing, cyanosis, or edema  PSYCH: AAOx3  NEUROLOGY: non-focal  SKIN: No rashes or lesions    LABS:                        12.3   10.58 )-----------( 241      ( 2019 09:38 )             36.9     11-12    140  |  100  |  11  ----------------------------<  106<H>  3.3<L>   |  25  |  0.80    Ca    9.4      2019 07:22  Phos  2.9     11-10  Mg     1.9     11-10    TPro  8.2  /  Alb  4.2  /  TBili  0.6  /  DBili  x   /  AST  44<H>  /  ALT  28  /  AlkPhos  72  11-10      CARDIAC MARKERS ( 2019 07:22 )  x     / x     / 539 U/L / x     / x      CARDIAC MARKERS ( 2019 06:26 )  x     / x     / 1235 U/L / x     / x      CARDIAC MARKERS ( 10 Nov 2019 18:44 )  x     / x     / 1028 U/L / x     / x          Urinalysis Basic - ( 10 Nov 2019 21:51 )    Color: Light Yellow / Appearance: Slightly Turbid / S.036 / pH: x  Gluc: x / Ketone: Large  / Bili: Negative / Urobili: Negative   Blood: x / Protein: 30 mg/dL / Nitrite: Negative   Leuk Esterase: Moderate / RBC: 3 /hpf / WBC 5 /HPF   Sq Epi: x / Non Sq Epi: 1 / Bacteria: Negative        RADIOLOGY & ADDITIONAL TESTS:    Imaging Personally Reviewed:    Consultant(s) Notes Reviewed:      Care Discussed with Consultants/Other Providers:

## 2019-11-12 NOTE — DIETITIAN INITIAL EVALUATION ADULT. - ADD RECOMMEND
1) Continue current DASH diet and monitor for any swallowing issues. 2) RD to remain available. 1) Continue current DASH diet and monitor for any swallowing issues. 2) Awaiting speech and swallow evaluation for supplement recommendations, will follow up. 1)Awaiting speech and swallow evaluation for diet consistency. 2) Continue DASH diet. 3) Recommend Mighty Shake twice a day. 4) Assist with meal ordering as needed.

## 2019-11-12 NOTE — DIETITIAN INITIAL EVALUATION ADULT. - ENERGY NEEDS
Pertinent Information: Patient is a 79 year old female with a history of HTN, hypothyroid, diverticulitis, depression, worsening memory and progressive dysarthtia.

## 2019-11-12 NOTE — PHYSICAL THERAPY INITIAL EVALUATION ADULT - GAIT DEVIATIONS NOTED, PT EVAL
decreased joni/increased time in double stance/decreased step length/decreased stride length/decreased weight-shifting ability

## 2019-11-12 NOTE — PHYSICAL THERAPY INITIAL EVALUATION ADULT - PRECAUTIONS/LIMITATIONS, REHAB EVAL
cont from HPI:Calcification and mild stenosis of the cavernous and super clinoid ICA segments. Patent proximal and middle cerebral arteries, fetal PCAs, with stenosis of the distal anterior, middle, and posterior cerebral artery branch vessels.Dominant anterior circulation, with hypoplastic poorly seen posterior circulation, with a dominant intradural right vertebral artery, and poorly seen intradural left vertebral artery with possible stenoses proximal to the vertebrobasilar junction. ; WBC 10.58 cont from HPI:Calcification and mild stenosis of the cavernous and super clinoid ICA segments. Patent proximal and middle cerebral arteries, fetal PCAs, with stenosis of the distal anterior, middle, and posterior cerebral artery branch vessels.Dominant anterior circulation, with hypoplastic poorly seen posterior circulation, with a dominant intradural right vertebral artery, and poorly seen intradural left vertebral artery with possible stenoses proximal to the vertebrobasilar junction. ; WBC 10.58; 80 y/o woman with PMHx HTN, forgetfulness but still drives, hypothyroid but not on synthroid BIBEMS for unwitnessed fall.  patient lives alone, and for the last "couple" of months developed dysarthria and had been getting progressively more forgetful for the last 6 months or so. Ptns house is unkept and for the past 2 months ptn has been unkept. Ptn has been resistant to see a doctor. Never evaluated for stroke, never evaluated for dementia. She does not have any children and she is , and does not have assistance with ADLs. Sister is next of kin. But while ptn is being evaluated she named her niece her HCP. cont from HPI:Calcification and mild stenosis of the cavernous and super clinoid ICA segments. Patent proximal and middle cerebral arteries, fetal PCAs, with stenosis of the distal anterior, middle, and posterior cerebral artery branch vessels.Dominant anterior circulation, with hypoplastic poorly seen posterior circulation, with a dominant intradural right vertebral artery, and poorly seen intradural left vertebral artery with possible stenoses proximal to the vertebrobasilar junction. ; WBC 10.58; 78 y/o woman with PMHx HTN, forgetfulness but still drives, hypothyroid but not on synthroid BIBEMS for unwitnessed fall.  patient lives alone, and for the last "couple" of months developed dysarthria and had been getting progressively more forgetful for the last 6 months or so. Ptns house is unkept and for the past 2 months ptn has been unkept. Ptn has been resistant to see a doctor. Never evaluated for stroke, never evaluated for dementia. She does not have any children and she is , and does not have assistance with ADLs. Sister is next of kin. But while ptn is being evaluated she named her niece her HCP./fall precautions

## 2019-11-13 LAB
ANION GAP SERPL CALC-SCNC: 12 MMOL/L — SIGNIFICANT CHANGE UP (ref 5–17)
BUN SERPL-MCNC: 10 MG/DL — SIGNIFICANT CHANGE UP (ref 7–23)
CALCIUM SERPL-MCNC: 9.5 MG/DL — SIGNIFICANT CHANGE UP (ref 8.4–10.5)
CHLORIDE SERPL-SCNC: 102 MMOL/L — SIGNIFICANT CHANGE UP (ref 96–108)
CO2 SERPL-SCNC: 26 MMOL/L — SIGNIFICANT CHANGE UP (ref 22–31)
CREAT SERPL-MCNC: 0.85 MG/DL — SIGNIFICANT CHANGE UP (ref 0.5–1.3)
GLUCOSE SERPL-MCNC: 88 MG/DL — SIGNIFICANT CHANGE UP (ref 70–99)
HCT VFR BLD CALC: 35.7 % — SIGNIFICANT CHANGE UP (ref 34.5–45)
HGB BLD-MCNC: 11.9 G/DL — SIGNIFICANT CHANGE UP (ref 11.5–15.5)
MCHC RBC-ENTMCNC: 29 PG — SIGNIFICANT CHANGE UP (ref 27–34)
MCHC RBC-ENTMCNC: 33.3 GM/DL — SIGNIFICANT CHANGE UP (ref 32–36)
MCV RBC AUTO: 86.9 FL — SIGNIFICANT CHANGE UP (ref 80–100)
PLATELET # BLD AUTO: 231 K/UL — SIGNIFICANT CHANGE UP (ref 150–400)
POTASSIUM SERPL-MCNC: 3.5 MMOL/L — SIGNIFICANT CHANGE UP (ref 3.5–5.3)
POTASSIUM SERPL-SCNC: 3.5 MMOL/L — SIGNIFICANT CHANGE UP (ref 3.5–5.3)
RBC # BLD: 4.11 M/UL — SIGNIFICANT CHANGE UP (ref 3.8–5.2)
RBC # FLD: 13.8 % — SIGNIFICANT CHANGE UP (ref 10.3–14.5)
SODIUM SERPL-SCNC: 140 MMOL/L — SIGNIFICANT CHANGE UP (ref 135–145)
WBC # BLD: 9.45 K/UL — SIGNIFICANT CHANGE UP (ref 3.8–10.5)
WBC # FLD AUTO: 9.45 K/UL — SIGNIFICANT CHANGE UP (ref 3.8–10.5)

## 2019-11-13 RX ORDER — AMLODIPINE BESYLATE 2.5 MG/1
10 TABLET ORAL DAILY
Refills: 0 | Status: DISCONTINUED | OUTPATIENT
Start: 2019-11-13 | End: 2019-11-14

## 2019-11-13 RX ADMIN — Medication 81 MILLIGRAM(S): at 11:19

## 2019-11-13 RX ADMIN — CEFTRIAXONE 100 MILLIGRAM(S): 500 INJECTION, POWDER, FOR SOLUTION INTRAMUSCULAR; INTRAVENOUS at 14:15

## 2019-11-13 RX ADMIN — AMLODIPINE BESYLATE 5 MILLIGRAM(S): 2.5 TABLET ORAL at 05:14

## 2019-11-13 RX ADMIN — ESCITALOPRAM OXALATE 20 MILLIGRAM(S): 10 TABLET, FILM COATED ORAL at 11:19

## 2019-11-13 RX ADMIN — ENOXAPARIN SODIUM 30 MILLIGRAM(S): 100 INJECTION SUBCUTANEOUS at 11:19

## 2019-11-13 RX ADMIN — SODIUM CHLORIDE 75 MILLILITER(S): 9 INJECTION INTRAMUSCULAR; INTRAVENOUS; SUBCUTANEOUS at 14:15

## 2019-11-13 RX ADMIN — ATORVASTATIN CALCIUM 10 MILLIGRAM(S): 80 TABLET, FILM COATED ORAL at 21:50

## 2019-11-13 NOTE — PROGRESS NOTE ADULT - SUBJECTIVE AND OBJECTIVE BOX
Infectious Diseases progress note:    Subjective: NAD, awake alert.   Denies abd pain, dysuria, fever/chills.     ROS:  CONSTITUTIONAL:  No fever, chills, rigors  CARDIOVASCULAR:  No chest pain or palpitations  RESPIRATORY:   No SOB, cough, dyspnea on exertion.  No wheezing  GASTROINTESTINAL:  No abd pain, N/V, diarrhea/constipation  EXTREMITIES:  No swelling or joint pain  GENITOURINARY:  No burning on urination, increased frequency or urgency.  No flank pain  NEUROLOGIC:  No HA, visual disturbances  SKIN: No rashes    Allergies    No Known Allergies    Intolerances        ANTIBIOTICS/RELEVANT:  antimicrobials  cefTRIAXone   IVPB      cefTRIAXone   IVPB 1000 milliGRAM(s) IV Intermittent every 24 hours    immunologic:    OTHER:  amLODIPine   Tablet 5 milliGRAM(s) Oral daily  aspirin enteric coated 81 milliGRAM(s) Oral daily  atorvastatin 10 milliGRAM(s) Oral at bedtime  enoxaparin Injectable 30 milliGRAM(s) SubCutaneous daily  escitalopram 20 milliGRAM(s) Oral daily  sodium chloride 0.9%. 1000 milliLiter(s) IV Continuous <Continuous>      Objective:  Vital Signs Last 24 Hrs  T(C): 36.7 (13 Nov 2019 10:03), Max: 36.7 (13 Nov 2019 04:09)  T(F): 98 (13 Nov 2019 10:03), Max: 98 (13 Nov 2019 04:09)  HR: 80 (13 Nov 2019 10:03) (70 - 80)  BP: 148/75 (13 Nov 2019 10:03) (139/76 - 156/83)  BP(mean): --  RR: 19 (13 Nov 2019 10:03) (18 - 19)  SpO2: 97% (13 Nov 2019 10:03) (97% - 97%)    PHYSICAL EXAM:  Constitutional:NAD  Eyes:LIZETH, EOMI  Ear/Nose/Throat: no thrush, mucositis.  Moist mucous membranes	  Neck:no JVD, no lymphadenopathy, supple  Respiratory: CTA maggie  Cardiovascular: S1S2 RRR, no murmurs  Gastrointestinal:soft, nontender,  nondistended (+) BS  Extremities:no e/e/c  Skin:  no rashes, open wounds or ulcerations        LABS:                        11.9   9.45  )-----------( 231      ( 13 Nov 2019 10:19 )             35.7     11-13    140  |  102  |  10  ----------------------------<  88  3.5   |  26  |  0.85    Ca    9.5      13 Nov 2019 07:12          MICROBIOLOGY:    Culture - Urine (11.10.19 @ 23:06)    Specimen Source: .Urine Clean Catch (Midstream)    Culture Results:   >=3 organisms. Probable collection contamination.    Culture - Blood (11.10.19 @ 22:36)    Specimen Source: .Blood Blood-Venous    Culture Results:   No growth to date.    Culture - Blood (11.10.19 @ 22:32)    Specimen Source: .Blood Blood-Peripheral    Culture Results:   No growth to date.          RADIOLOGY & ADDITIONAL STUDIES:    < from: MR Angio Neck No Cont (11.11.19 @ 19:14) >  IMPRESSION:    Redemonstration of volume loss, microvascular disease, no obvious   restricted diffusion, hemorrhage or midline shift.    Calcification and mild stenosis of the cavernous and super clinoid ICA   segments.Patent proximal and middle cerebral arteries, fetal PCAs, with   stenosis of the distal anterior, middle, and posterior cerebral artery   branch vessels.    Dominant anterior circulation, with hypoplastic poorly seen posterior   circulation, with a dominant intradural right vertebral artery, and   poorly seen intradural left vertebral artery with possible stenoses   proximal to the vertebrobasilar junction.    Motion limited assessment of extra cranial circulation, patent bilateral   common and internal carotid arteries, dominant right and hypoplastic left   vertebral artery limited assessment of the ICA origins, suggest repeat   imaging when patient clinically able.    < end of copied text >

## 2019-11-13 NOTE — SWALLOW BEDSIDE ASSESSMENT ADULT - PHARYNGEAL PHASE
Delayed pharyngeal swallow/Decreased laryngeal elevation Delayed pharyngeal swallow/Decreased laryngeal elevation/Cough post oral intake Throat clear post oral intake/Delayed cough post oral intake/Delayed pharyngeal swallow/Decreased laryngeal elevation Decreased laryngeal elevation/Delayed cough post oral intake/Wet vocal quality post oral intake/Throat clear post oral intake/Delayed pharyngeal swallow Decreased laryngeal elevation/Delayed pharyngeal swallow

## 2019-11-13 NOTE — SWALLOW BEDSIDE ASSESSMENT ADULT - SWALLOW EVAL: PATIENT/FAMILY GOALS STATEMENT
Pt unreliable history of PMH; however, denies previous swallow evaluations as well as a history of previous dysphagia or recurrent pneumonias. Pt reports change in voice, but cannot indicate onset. Pt unreliable historian of PMH; however, denies h/o dysphagia or previous swallow evaluations. Pt also denied h/o recurrent pneumonias. Pt reported recent change in speech marked by slurring however unable to specify onset.

## 2019-11-13 NOTE — PROGRESS NOTE ADULT - SUBJECTIVE AND OBJECTIVE BOX
[facility]      Seen earlier today, and examined.  - Today, patient is without complaints.           *****MEDICATIONS: Current medication reviewed and documented.    MEDICATIONS  (STANDING):  amLODIPine   Tablet 10 milliGRAM(s) Oral daily  aspirin enteric coated 81 milliGRAM(s) Oral daily  atorvastatin 10 milliGRAM(s) Oral at bedtime  cefTRIAXone   IVPB      cefTRIAXone   IVPB 1000 milliGRAM(s) IV Intermittent every 24 hours  enoxaparin Injectable 30 milliGRAM(s) SubCutaneous daily  escitalopram 20 milliGRAM(s) Oral daily  sodium chloride 0.9%. 1000 milliLiter(s) (75 mL/Hr) IV Continuous <Continuous>    MEDICATIONS  (PRN):          ***** VITAL SIGNS:  T(F): 98 (19 @ 10:03), Max: 98 (19 @ 04:09)  HR: 80 (19 @ 10:03) (70 - 80)  BP: 148/75 (19 @ 10:03) (139/76 - 156/83)  RR: 19 (19 @ 10:03) (18 - 19)  SpO2: 97% (19 @ 10:03) (97% - 97%)  Wt(kg): --  ,   I&O's Summary    2019 07:  -  2019 07:00  --------------------------------------------------------  IN: 3060 mL / OUT: 0 mL / NET: 3060 mL    2019 07:  -  2019 19:45  --------------------------------------------------------  IN: 1550 mL / OUT: 0 mL / NET: 1550 mL             *****PHYSICAL EXAM: Alert oriented x 3   Attention comprehension are fair. Able to name, repeat, read without any difficulty.   Able to follow 3 step commands.     EOM limited upgaze did not appreciate fatiguing with upgaze    VFF to confrontration   upgaze with nystagmus     No facial asymmetry   dysarthria    hoarse voice   Tongue is midline, atrophy of tongue   Palate elevates symmetrically   Moving all 4 ext symmetrically no pronator drift   Reflexes are 2+ throughout   no fasciculations of the muscles noted.   sensation is grossly symmetric  Gait : not assessed.  B/L down going toes        *****LAB AND IMAGIN.9   9.45  )-----------( 231      ( 2019 10:19 )             35.7                   140  |  102  |  10  ----------------------------<  88  3.5   |  26  |  0.85    Ca    9.5      2019 07:12             CARDIAC MARKERS ( 2019 07:22 )  x     / x     / 539 U/L / x     / x                    [All pertinent recent Imaging/Reports reviewed]           *****A S S E S S M E N T   A N D   P L A N :      80 y/o woman with PMHx HTN, forgetfulness but still drives, hypothyroid but not on synthroid BIBEMS for unwitnessed fall. Per niece and sister at bedside, patient lives alone, and for the last "couple" of months developed dysarthria and had been getting progressively more forgetful for the last 6 months or so. Ptns house is unkept and for the past 2 months ptn has been unkept. Ptn has been resistant to see a doctor. Never evaluated for stroke, never evaluated for dementia. She does not have any children and she is , and does not have assistance with ADLs. Sister is next of kin. But while ptn is being evaluated she named her niece her HCP.   Last evening, patient called niece's residence. She is unaccounted for until this AM around 10, when sister called her about [employer] and learned she had fallen down. A neighbor called 911. Patient has no memory of what happened. Fall was unwitnessed, unknown if there was LOC. Denies pain, however, poor historian. Ptn has no insight into her progressive worsening function.  No fever, chills, chest pain, SOB, n/v/d. Unclear if patient has been able to take medications as prescribed.       Problem/Recommendations 1: dysarthia, imbalance with progressive decline over the last 6 mos.   also noted on exam tongue atrophy, possible tongue fasiculations.   difficulty with upgaze, hoarseness.      DDx: neurodegenerative process such as  progressive supranuclear palsy /opcd vs  neuromuscular/motor neuron disease  vs autoimmune encephalitis   mri brain without acute process.   mra head  will get acetylcholine receptor antibody/ anti musk antibodies.   emg/ncv will need to be arranged in rehab instructed niece to call [phone] to arrrange it.   elevated CPK likely rhabdo  s/s eval as pt seems to be having difficulty with food bolus   unable to tolerate ENT eval as pt was not cooperative.  discussed at length with niece regarding findings.  LP may be helpful however, pt refused and niece also was in agreement with pt.   She is focusing on her comfort. She wants to place her in a facility..      Problem/Recommendations 2: rhabdomyolysis improving    CPK trending down.        Thank you for allowing me to participate in the care of this patient. Please do not hesitate to call me if you have any  questions.        ________________  Wendy Joyce MD  [facility] ([facility]  [phone]      33 minutes spent on total encounter; more than 50 % of the visit was  spent counseling about plan of care, compliance to diet/exercise and medication regimen and or  coordinating care by the attending physician.      It is advised that stroke patients follow up with NP Nathaly Lindsay @ [phone] in 1- 2 weeks.   Others please follow up with Dr. Michael Nissenbaum [phone]

## 2019-11-13 NOTE — SWALLOW BEDSIDE ASSESSMENT ADULT - ASR SWALLOW LINGUAL MOBILITY
Lingual fasciculations noted/within functional limits Lingual fasciculations noted, slowed and reduced ROM

## 2019-11-13 NOTE — PROGRESS NOTE ADULT - SUBJECTIVE AND OBJECTIVE BOX
Patient is a 79y old  Female who presents with a chief complaint of slurred speech, frequent falls (13 Nov 2019 17:58)      SUBJECTIVE / OVERNIGHT EVENTS: S&S at bedside done, ptn awaiting MBS in am. today last day ABx    MEDICATIONS  (STANDING):  amLODIPine   Tablet 5 milliGRAM(s) Oral daily  aspirin enteric coated 81 milliGRAM(s) Oral daily  atorvastatin 10 milliGRAM(s) Oral at bedtime  cefTRIAXone   IVPB      cefTRIAXone   IVPB 1000 milliGRAM(s) IV Intermittent every 24 hours  enoxaparin Injectable 30 milliGRAM(s) SubCutaneous daily  escitalopram 20 milliGRAM(s) Oral daily  sodium chloride 0.9%. 1000 milliLiter(s) (75 mL/Hr) IV Continuous <Continuous>    MEDICATIONS  (PRN):      Vital Signs Last 24 Hrs  T(F): 98 (11-13-19 @ 10:03), Max: 98 (11-13-19 @ 04:09)  HR: 80 (11-13-19 @ 10:03) (70 - 80)  BP: 148/75 (11-13-19 @ 10:03) (139/76 - 156/83)  RR: 19 (11-13-19 @ 10:03) (18 - 19)  SpO2: 97% (11-13-19 @ 10:03) (97% - 97%)  Telemetry:   CAPILLARY BLOOD GLUCOSE        I&O's Summary    12 Nov 2019 07:01  -  13 Nov 2019 07:00  --------------------------------------------------------  IN: 3060 mL / OUT: 0 mL / NET: 3060 mL    13 Nov 2019 07:01  -  13 Nov 2019 19:16  --------------------------------------------------------  IN: 1550 mL / OUT: 0 mL / NET: 1550 mL        PHYSICAL EXAM:  GENERAL: NAD, well-developed  HEAD:  Atraumatic, Normocephalic  EYES: EOMI, PERRLA, conjunctiva and sclera clear  NECK: Supple, No JVD  CHEST/LUNG: Clear to auscultation bilaterally; No wheeze  HEART: Regular rate and rhythm; No murmurs, rubs, or gallops  ABDOMEN: Soft, Nontender, Nondistended; Bowel sounds present  EXTREMITIES:  2+ Peripheral Pulses, No clubbing, cyanosis, or edema  PSYCH: AAOx3  NEUROLOGY: non-focal  SKIN: No rashes or lesions    LABS:                        11.9   9.45  )-----------( 231      ( 13 Nov 2019 10:19 )             35.7     11-13    140  |  102  |  10  ----------------------------<  88  3.5   |  26  |  0.85    Ca    9.5      13 Nov 2019 07:12        CARDIAC MARKERS ( 12 Nov 2019 07:22 )  x     / x     / 539 U/L / x     / x              RADIOLOGY & ADDITIONAL TESTS:    Imaging Personally Reviewed:    Consultant(s) Notes Reviewed:      Care Discussed with Consultants/Other Providers:

## 2019-11-13 NOTE — SWALLOW BEDSIDE ASSESSMENT ADULT - ASR SWALLOW ASPIRATION MONITOR
pneumonia/fever/throat clearing/upper respiratory infection/change of breathing pattern/gurgly voice/cough

## 2019-11-13 NOTE — SWALLOW BEDSIDE ASSESSMENT ADULT - ORAL PHASE
Delayed oral transit time Within functional limits Delayed oral transit time/suspect uncontrolled loss Decreased anterior-posterior movement of the bolus/Delayed oral transit time/mild-moderate lingual residue, cleared with liquid wash suspect uncontrolled loss

## 2019-11-13 NOTE — SWALLOW BEDSIDE ASSESSMENT ADULT - SWALLOW EVAL: DIAGNOSIS
Pt presents with 1) an oral and suspected pharyngeal dysphagia marked by prolonged mastication, oral residue of solids, delayed oral transit time, suspected uncontrolled loss, a delayed pharyngeal swallow, reduced hyolaryngeal excursion, and signs suggestive of laryngeal penetration/aspiration on thin liquids. 2) Dysarthria 3) Dysphonia Pt presents with 1) an oral and suspected pharyngeal dysphagia marked by prolonged mastication, post swallow oral residue with solids, delayed oral transit time, suspected uncontrolled loss, delayed pharyngeal swallows, reduced hyolaryngeal excursion, and immediate and delayed coughing post PO intake of thin liquids suggestive of laryngeal penetration/aspiration. 2) Dysarthria

## 2019-11-13 NOTE — SWALLOW BEDSIDE ASSESSMENT ADULT - SLP PERTINENT HISTORY OF CURRENT PROBLEM
80 y/o F with PMHx HTN, forgetfulness but still drives, hypothyroid but not on synthroid BIBEMS for unwitnessed fall. Per niece and sister at bedside, pt lives alone, and for the last "couple" of months developed dysarthria and had been getting progressively more forgetful for ~6 months. Pts house is unkept and for the past 2 months pt has been unkept. Pt has been resistant to see a doctor. Never evaluated for stroke, never evaluated for dementia. She doesn’t have children, is , and doesn’t have assistance with ADLs. Sister next of kin, but pt named her niece her HCP. Last evening, pt called niece's residence. Pt unaccounted for until this AM around 10, when sister called her and learned she fell down. Neighbor called 911. Pt w/ no memory of what happened. Fall unwitnessed, unknown if there was LOC. Denies pain, but poor historian. Pt w/ no insight into her progressive worsening function.  No fever, chills, chest pain, SOB, n/v/d.

## 2019-11-13 NOTE — SWALLOW BEDSIDE ASSESSMENT ADULT - SWALLOW EVAL: RECOMMENDED FEEDING/EATING TECHNIQUES
crush medication (when feasible)/maintain upright posture during/after eating for 30 mins/no straws/small sips/bites/position upright (90 degrees)

## 2019-11-13 NOTE — SWALLOW BEDSIDE ASSESSMENT ADULT - COMMENTS
See addendum. See addendum for hospital course.  IMAGING:  CT Chest & A/P (11/10): No acute traumatic injury in the chest abdomen or pelvis. Bilateral lower lobe subsegmental atelectasis.  Ct Head (11/10): There is no acute intracranial hemorrhage, mass effect, or midline shift. CT Cervical Spine: No acute displaced cervical spine fracture or evidence of traumatic malalignment.  EKG (11/10): Normal sinus rhythm. Possible left atrial enlargement. Left ventricular hypertrophy. Possible lateral infarct, age undetermined. Abnormal ECG.  MRI Angio Head & Neck (11/11): Redemonstration of volume loss, microvascular disease, no obvious restricted diffusion, hemorrhage or midline shift. Calcification and mild stenosis of the cavernous and super clinoid ICA segments. Patent proximal and middle cerebral arteries, fetal PCAs, with stenosis of the distal anterior, middle, and posterior cerebral artery branch vessels. Dominant anterior circulation, with hypoplastic poorly seen posterior circulation, with a dominant intradural right vertebral artery, and poorly seen intradural left vertebral artery with possible stenoses proximal to the vertebrobasilar junction. Motion limited assessment of extra cranial circulation, patent bilateral common and internal carotid arteries, dominant right and hypoplastic left vertebral artery limited assessment of the ICA origins, suggest repeat imaging when patient clinically able.  US VA Duplex (11/11): No hemodynamically significant stenosis of either internal carotid artery. Flow-limiting stenosis in the right external carotid artery.  Transthroacic echo (11/12): Normal left ventricular internal dimensions and wall thicknesses. Normal left ventricular systolic function. No segmental wall motion abnormalities. Mild diastolic dysfunction (Stage I). Normal right ventricular size and function. See addendum for hospital course.  IMAGING HISTORY:  CT Chest & A/P (11/10): No acute traumatic injury in the chest abdomen or pelvis. Bilateral lower lobe subsegmental atelectasis.  Ct Head (11/10): There is no acute intracranial hemorrhage, mass effect, or midline shift. CT Cervical Spine: No acute displaced cervical spine fracture or evidence of traumatic malalignment.  EKG (11/10): Normal sinus rhythm. Possible left atrial enlargement. Left ventricular hypertrophy. Possible lateral infarct, age undetermined. Abnormal ECG.  MRI Angio Head & Neck (11/11): Redemonstration of volume loss, microvascular disease, no obvious restricted diffusion, hemorrhage or midline shift. Calcification and mild stenosis of the cavernous and super clinoid ICA segments. Patent proximal and middle cerebral arteries, fetal PCAs, with stenosis of the distal anterior, middle, and posterior cerebral artery branch vessels. Dominant anterior circulation, with hypoplastic poorly seen posterior circulation, with a dominant intradural right vertebral artery, and poorly seen intradural left vertebral artery with possible stenoses proximal to the vertebrobasilar junction. Motion limited assessment of extra cranial circulation, patent bilateral common and internal carotid arteries, dominant right and hypoplastic left vertebral artery limited assessment of the ICA origins, suggest repeat imaging when patient clinically able.  US VA Duplex (11/11): No hemodynamically significant stenosis of either internal carotid artery. Flow-limiting stenosis in the right external carotid artery.  Transthroacic echo (11/12): Normal left ventricular internal dimensions and wall thicknesses. Normal left ventricular systolic function. No segmental wall motion abnormalities. Mild diastolic dysfunction (Stage I). Normal right ventricular size and function.

## 2019-11-13 NOTE — PROGRESS NOTE ADULT - SUBJECTIVE AND OBJECTIVE BOX
Subjective: Patient seen and examined. No new events except as noted.   feels ok   More alert   REVIEW OF SYSTEMS:    CONSTITUTIONAL: + weakness, fevers or chills  EYES/ENT: No visual changes;  No vertigo or throat pain   NECK: No pain or stiffness  RESPIRATORY: No cough, wheezing, hemoptysis; No shortness of breath  CARDIOVASCULAR: No chest pain or palpitations  GASTROINTESTINAL: No abdominal or epigastric pain. No nausea, vomiting, or hematemesis; No diarrhea or constipation. No melena or hematochezia.  GENITOURINARY: No dysuria, frequency or hematuria  NEUROLOGICAL: No numbness or weakness  SKIN: No itching, burning, rashes, or lesions   All other review of systems is negative unless indicated above.    MEDICATIONS:  MEDICATIONS  (STANDING):  amLODIPine   Tablet 5 milliGRAM(s) Oral daily  aspirin enteric coated 81 milliGRAM(s) Oral daily  atorvastatin 10 milliGRAM(s) Oral at bedtime  cefTRIAXone   IVPB      cefTRIAXone   IVPB 1000 milliGRAM(s) IV Intermittent every 24 hours  enoxaparin Injectable 30 milliGRAM(s) SubCutaneous daily  escitalopram 20 milliGRAM(s) Oral daily  sodium chloride 0.9%. 1000 milliLiter(s) (75 mL/Hr) IV Continuous <Continuous>      PHYSICAL EXAM:  T(C): 36.7 (11-13-19 @ 10:03), Max: 36.7 (11-13-19 @ 04:09)  HR: 80 (11-13-19 @ 10:03) (70 - 80)  BP: 148/75 (11-13-19 @ 10:03) (122/70 - 156/83)  RR: 19 (11-13-19 @ 10:03) (18 - 19)  SpO2: 97% (11-13-19 @ 10:03) (97% - 98%)  Wt(kg): --  I&O's Summary    12 Nov 2019 07:01  -  13 Nov 2019 07:00  --------------------------------------------------------  IN: 3060 mL / OUT: 0 mL / NET: 3060 mL          Appearance: NAD	  HEENT:  Dry oral mucosa, PERRL, EOMI	  Lymphatic: No lymphadenopathy , no edema  Cardiovascular: Normal S1 S2, No JVD, No murmurs , Peripheral pulses palpable 2+ bilaterally  Respiratory: Lungs clear to auscultation, normal effort 	  Gastrointestinal:  Soft, Non-tender, + BS	  Skin: No rashes, No ecchymoses, No cyanosis, warm to touch  Musculoskeletal: Normal range of motion, normal strength  Psychiatry:  Mood & affect appropriate  Ext: No edema      LABS:    CARDIAC MARKERS:  CARDIAC MARKERS ( 12 Nov 2019 07:22 )  x     / x     / 539 U/L / x     / x      CARDIAC MARKERS ( 11 Nov 2019 06:26 )  x     / x     / 1235 U/L / x     / x      CARDIAC MARKERS ( 10 Nov 2019 18:44 )  x     / x     / 1028 U/L / x     / x                                    11.9   9.45  )-----------( 231      ( 13 Nov 2019 10:19 )             35.7     11-13    140  |  102  |  10  ----------------------------<  88  3.5   |  26  |  0.85    Ca    9.5      13 Nov 2019 07:12      proBNP:   Lipid Profile:   HgA1c:   TSH:               TELEMETRY: 	    ECG:  	  RADIOLOGY:    DIAGNOSTIC TESTING:  [ ] Echocardiogram:< from: Transthoracic Echocardiogram (11.12.19 @ 07:58) >    Patient name: CLAUS JOHNS  YOB: 1940   Age: 79 (F)   MR#: [id number]  Study Date: 11/12/2019  [facility]heen [facility] RDCS  Study quality: Technically fair  Referring Physician: Aleksandra Walden MD  Blood Pressure: 150/76 mmHg  Height: 155 cm  Weight: 66 kg  BSA: 1.7 m2  ------------------------------------------------------------------------  PROCEDURE: Transthoracic echocardiogram with 2-D, M-Mode  and complete spectral and color flow Doppler.  INDICATION: Syncope and collapse (R55)  ------------------------------------------------------------------------  Dimensions:    Normal Values:  LA:     3.3    2.0 - 4.0 cm  Ao:     2.8    2.0 - 3.8 cm  SEPTUM: 0.8    0.6 - 1.2 cm  PWT:    0.8    0.6 - 1.1 cm  LVIDd:  3.6    3.0 - 5.6 cm  LVIDs:  2.1    1.8 - 4.0 cm  Derived variables:  LVMI: 48 g/m2  RWT: 0.44  Fractional short: 42 %  EF (Modified Garcia Rule): 69 %  ------------------------------------------------------------------------  Observations:  Mitral Valve: Normal mitral valve.  Aortic Valve/Aorta: Calcified trileaflet aortic valve with  normal opening.  Aortic Root: 2.8 cm.  Left Atrium: Normal left atrium.  LA volume index = 15  cc/m2.  Left Ventricle: Normal left ventricular systolic function.  No segmental wall motion abnormalities. Normal left  ventricular internal dimensions and wall thicknesses. Mild  diastolic dysfunction (Stage I).  Right Heart: Normal right atrium. Normal right ventricular  size and function. Normal tricuspid valve. Normal pulmonic  valve.  Pericardium/Pleura: Normal pericardium with no pericardial  effusion.  Hemodynamic: Estimated right atrial pressure is 8 mm Hg.  ------------------------------------------------------------------------  Conclusions:  1. Normal left ventricular internal dimensions and wall  thicknesses.  2. Normal left ventricular systolic function. No segmental  wall motion abnormalities.  3. Mild diastolic dysfunction (Stage I).  4. Normal right ventricular size and function.  ------------------------------------------------------------------------  Confirmed on  11/12/2019 - 12:08:39 by Nickie Mckeon M.D.  ------------------------------------------------------------------------    < end of copied text >    [ ]  Catheterization:  [ ] Stress Test:    OTHER:

## 2019-11-13 NOTE — SWALLOW BEDSIDE ASSESSMENT ADULT - ORAL PREPARATORY PHASE
Within functional limits Prolonged yet functional mastication; Min-mod oral residue (cleared with liquid wash)

## 2019-11-13 NOTE — SWALLOW BEDSIDE ASSESSMENT ADULT - SLP GENERAL OBSERVATIONS
Pt encountered sitting in chair. AA+Ox2. Pt able to follow simple commands for exam and answer some simple question; however unreliable historian. +RA. +Dysarthria. +Lingual fasciculations. +Dysphonia. Pt encountered sitting in chair. AA+Ox2. Pt able to follow simple commands for exam and answer some simple questions; however unreliable historian. +RA. +Dysarthria. +Lingual fasciculations. Pt pleasant and cooperative.

## 2019-11-13 NOTE — PROGRESS NOTE ADULT - ASSESSMENT
80 y/o woman with PMHx HTN, forgetfulness but still drives, hypothyroid but not on synthroid BIBEMS for unwitnessed fall. Per niece and sister at bedside, patient lives alone, and for the last "couple" of months developed dysarthria and had been getting progressively more forgetful for the last 6 months or so. Ptns house is unkept and for the past 2 months ptn has been unkept. Ptn has been resistant to see a doctor. Never evaluated for stroke, never evaluated for dementia. She does not have any children and she is , and does not have assistance with ADLs. Sister is next of kin. But while ptn is being evaluated she named her niece her HCP.   Last evening, patient called niece's residence. She is unaccounted for until this AM around 10, when sister called her about [employer] and learned she had fallen down. A neighbor called 911. Patient has no memory of what happened. Fall was unwitnessed, unknown if there was LOC. Denies pain, however, poor historian. Ptn has no insight into her progressive worsening function.  No fever, chills, chest pain, SOB, n/v/d. Unclear if patient has been able to take medications as prescribed. (10 Nov 2019 21:38)    ER vss, afebrile.  WBC 16.0 --> 10.5.  UA (+) mod LE, sm blood, 5 WBCs.  UCx contaminated.  CT c/a/p without traumatic injury.  CT cervical spine/Head WNL.   Pt on rocephin for UTI.  ID consult called for further abx management.       UTI:    - Pt p/w unwitnessed fall.  Found to have leukocytosis, UA with LE, sm blood.  Pt poor historian, currently denies urinary symptoms.  Reasonable to treat in elderly patient given clinical history.      - No culture data available, ucx contaminated.  WBC improving.  Recommend d/c rocephin after 3 day course for uncomplicated UTI on 11/13    - Monitor WBC and temp curve.  CT a/p no infectious focus.       Will follow,    Mariya Esposito  [phone]

## 2019-11-13 NOTE — SWALLOW BEDSIDE ASSESSMENT ADULT - ASPIRATION PRECAUTIONS
Monitor for s/s aspiration/laryngeal penetration. If noted:  D/C p.o. intake, provide non-oral nutrition/hydration/meds, and contact this service @ [id number]/yes

## 2019-11-14 ENCOUNTER — TRANSCRIPTION ENCOUNTER (OUTPATIENT)
Age: 79
End: 2019-11-14

## 2019-11-14 VITALS
TEMPERATURE: 97 F | DIASTOLIC BLOOD PRESSURE: 78 MMHG | RESPIRATION RATE: 18 BRPM | OXYGEN SATURATION: 98 % | SYSTOLIC BLOOD PRESSURE: 156 MMHG | HEART RATE: 77 BPM

## 2019-11-14 LAB
ANION GAP SERPL CALC-SCNC: 11 MMOL/L — SIGNIFICANT CHANGE UP (ref 5–17)
BUN SERPL-MCNC: 5 MG/DL — LOW (ref 7–23)
CALCIUM SERPL-MCNC: 9.1 MG/DL — SIGNIFICANT CHANGE UP (ref 8.4–10.5)
CHLORIDE SERPL-SCNC: 105 MMOL/L — SIGNIFICANT CHANGE UP (ref 96–108)
CK SERPL-CCNC: 146 U/L — SIGNIFICANT CHANGE UP (ref 25–170)
CO2 SERPL-SCNC: 25 MMOL/L — SIGNIFICANT CHANGE UP (ref 22–31)
CREAT SERPL-MCNC: 0.81 MG/DL — SIGNIFICANT CHANGE UP (ref 0.5–1.3)
GLUCOSE SERPL-MCNC: 93 MG/DL — SIGNIFICANT CHANGE UP (ref 70–99)
HCT VFR BLD CALC: 34.9 % — SIGNIFICANT CHANGE UP (ref 34.5–45)
HGB BLD-MCNC: 11.5 G/DL — SIGNIFICANT CHANGE UP (ref 11.5–15.5)
MCHC RBC-ENTMCNC: 29.1 PG — SIGNIFICANT CHANGE UP (ref 27–34)
MCHC RBC-ENTMCNC: 33 GM/DL — SIGNIFICANT CHANGE UP (ref 32–36)
MCV RBC AUTO: 88.4 FL — SIGNIFICANT CHANGE UP (ref 80–100)
PLATELET # BLD AUTO: 215 K/UL — SIGNIFICANT CHANGE UP (ref 150–400)
POTASSIUM SERPL-MCNC: 3.3 MMOL/L — LOW (ref 3.5–5.3)
POTASSIUM SERPL-SCNC: 3.3 MMOL/L — LOW (ref 3.5–5.3)
RBC # BLD: 3.95 M/UL — SIGNIFICANT CHANGE UP (ref 3.8–5.2)
RBC # FLD: 13.7 % — SIGNIFICANT CHANGE UP (ref 10.3–14.5)
SODIUM SERPL-SCNC: 141 MMOL/L — SIGNIFICANT CHANGE UP (ref 135–145)
WBC # BLD: 9.16 K/UL — SIGNIFICANT CHANGE UP (ref 3.8–10.5)
WBC # FLD AUTO: 9.16 K/UL — SIGNIFICANT CHANGE UP (ref 3.8–10.5)

## 2019-11-14 PROCEDURE — 82550 ASSAY OF CK (CPK): CPT

## 2019-11-14 PROCEDURE — 70547 MR ANGIOGRAPHY NECK W/O DYE: CPT

## 2019-11-14 PROCEDURE — 93880 EXTRACRANIAL BILAT STUDY: CPT

## 2019-11-14 PROCEDURE — 85027 COMPLETE CBC AUTOMATED: CPT

## 2019-11-14 PROCEDURE — 71260 CT THORAX DX C+: CPT

## 2019-11-14 PROCEDURE — 74230 X-RAY XM SWLNG FUNCJ C+: CPT

## 2019-11-14 PROCEDURE — 99285 EMERGENCY DEPT VISIT HI MDM: CPT

## 2019-11-14 PROCEDURE — 74177 CT ABD & PELVIS W/CONTRAST: CPT

## 2019-11-14 PROCEDURE — 83036 HEMOGLOBIN GLYCOSYLATED A1C: CPT

## 2019-11-14 PROCEDURE — 74230 X-RAY XM SWLNG FUNCJ C+: CPT | Mod: 26

## 2019-11-14 PROCEDURE — 70551 MRI BRAIN STEM W/O DYE: CPT

## 2019-11-14 PROCEDURE — 84100 ASSAY OF PHOSPHORUS: CPT

## 2019-11-14 PROCEDURE — 93306 TTE W/DOPPLER COMPLETE: CPT

## 2019-11-14 PROCEDURE — 72125 CT NECK SPINE W/O DYE: CPT

## 2019-11-14 PROCEDURE — 82607 VITAMIN B-12: CPT

## 2019-11-14 PROCEDURE — 92610 EVALUATE SWALLOWING FUNCTION: CPT

## 2019-11-14 PROCEDURE — 87086 URINE CULTURE/COLONY COUNT: CPT

## 2019-11-14 PROCEDURE — 86780 TREPONEMA PALLIDUM: CPT

## 2019-11-14 PROCEDURE — 82746 ASSAY OF FOLIC ACID SERUM: CPT

## 2019-11-14 PROCEDURE — 80053 COMPREHEN METABOLIC PANEL: CPT

## 2019-11-14 PROCEDURE — 92611 MOTION FLUOROSCOPY/SWALLOW: CPT

## 2019-11-14 PROCEDURE — 93005 ELECTROCARDIOGRAM TRACING: CPT

## 2019-11-14 PROCEDURE — 86366 MUSCLE-SPECIFIC KINASE ANTB: CPT

## 2019-11-14 PROCEDURE — 80048 BASIC METABOLIC PNL TOTAL CA: CPT

## 2019-11-14 PROCEDURE — 84484 ASSAY OF TROPONIN QUANT: CPT

## 2019-11-14 PROCEDURE — 70544 MR ANGIOGRAPHY HEAD W/O DYE: CPT

## 2019-11-14 PROCEDURE — 97162 PT EVAL MOD COMPLEX 30 MIN: CPT

## 2019-11-14 PROCEDURE — 87040 BLOOD CULTURE FOR BACTERIA: CPT

## 2019-11-14 PROCEDURE — 86041 ACETYLCHOLN RCPTR BNDNG ANTB: CPT

## 2019-11-14 PROCEDURE — 83735 ASSAY OF MAGNESIUM: CPT

## 2019-11-14 PROCEDURE — 84443 ASSAY THYROID STIM HORMONE: CPT

## 2019-11-14 PROCEDURE — 70450 CT HEAD/BRAIN W/O DYE: CPT

## 2019-11-14 PROCEDURE — 81001 URINALYSIS AUTO W/SCOPE: CPT

## 2019-11-14 RX ORDER — POTASSIUM CHLORIDE 20 MEQ
40 PACKET (EA) ORAL ONCE
Refills: 0 | Status: COMPLETED | OUTPATIENT
Start: 2019-11-14 | End: 2019-11-14

## 2019-11-14 RX ORDER — POTASSIUM CHLORIDE 20 MEQ
40 PACKET (EA) ORAL ONCE
Refills: 0 | Status: DISCONTINUED | OUTPATIENT
Start: 2019-11-14 | End: 2019-11-14

## 2019-11-14 RX ORDER — ATORVASTATIN CALCIUM 80 MG/1
1 TABLET, FILM COATED ORAL
Qty: 0 | Refills: 0 | DISCHARGE
Start: 2019-11-14

## 2019-11-14 RX ORDER — TRIAMTERENE/HYDROCHLOROTHIAZID 75 MG-50MG
1 TABLET ORAL
Qty: 0 | Refills: 0 | DISCHARGE

## 2019-11-14 RX ORDER — LOSARTAN POTASSIUM 100 MG/1
1 TABLET, FILM COATED ORAL
Qty: 0 | Refills: 0 | DISCHARGE
Start: 2019-11-14

## 2019-11-14 RX ORDER — LOSARTAN POTASSIUM 100 MG/1
25 TABLET, FILM COATED ORAL DAILY
Refills: 0 | Status: DISCONTINUED | OUTPATIENT
Start: 2019-11-14 | End: 2019-11-14

## 2019-11-14 RX ORDER — ASPIRIN/CALCIUM CARB/MAGNESIUM 324 MG
1 TABLET ORAL
Qty: 0 | Refills: 0 | DISCHARGE
Start: 2019-11-14

## 2019-11-14 RX ORDER — AMLODIPINE BESYLATE 2.5 MG/1
1 TABLET ORAL
Qty: 0 | Refills: 0 | DISCHARGE
Start: 2019-11-14

## 2019-11-14 RX ADMIN — ESCITALOPRAM OXALATE 20 MILLIGRAM(S): 10 TABLET, FILM COATED ORAL at 12:40

## 2019-11-14 RX ADMIN — ENOXAPARIN SODIUM 30 MILLIGRAM(S): 100 INJECTION SUBCUTANEOUS at 12:40

## 2019-11-14 RX ADMIN — AMLODIPINE BESYLATE 10 MILLIGRAM(S): 2.5 TABLET ORAL at 05:07

## 2019-11-14 RX ADMIN — LOSARTAN POTASSIUM 25 MILLIGRAM(S): 100 TABLET, FILM COATED ORAL at 16:23

## 2019-11-14 RX ADMIN — Medication 81 MILLIGRAM(S): at 12:40

## 2019-11-14 RX ADMIN — Medication 40 MILLIEQUIVALENT(S): at 14:25

## 2019-11-14 RX ADMIN — SODIUM CHLORIDE 75 MILLILITER(S): 9 INJECTION INTRAMUSCULAR; INTRAVENOUS; SUBCUTANEOUS at 05:05

## 2019-11-14 NOTE — PROGRESS NOTE ADULT - ASSESSMENT
80 y/o woman with PMHx HTN, forgetfulness but still drives, hypothyroid but not on synthroid BIBEMS for unwitnessed fall. Per niece and sister at bedside, patient lives alone, and for the last "couple" of months developed dysarthria and had been getting progressively more forgetful for the last 6 months or so. Ptns house is unkept and for the past 2 months ptn has been unkept. Ptn has been resistant to see a doctor. Never evaluated for stroke, never evaluated for dementia. She does not have any children and she is , and does not have assistance with ADLs. Sister is next of kin. But while ptn is being evaluated she named her niece her HCP.   Last evening, patient called niece's residence. She is unaccounted for until this AM around 10, when sister called her about [employer] and learned she had fallen down. A neighbor called 911. Patient has no memory of what happened. Fall was unwitnessed, unknown if there was LOC. Denies pain, however, poor historian. Ptn has no insight into her progressive worsening function.  No fever, chills, chest pain, SOB, n/v/d. Unclear if patient has been able to take medications as prescribed. (10 Nov 2019 21:38)    ER vss, afebrile.  WBC 16.0 --> 10.5.  UA (+) mod LE, sm blood, 5 WBCs.  UCx contaminated.  CT c/a/p without traumatic injury.  CT cervical spine/Head WNL.   Pt on rocephin for UTI.  ID consult called for further abx management.       UTI:    - Pt p/w unwitnessed fall.  Found to have leukocytosis, UA with LE, sm blood.  Pt poor historian, currently denies urinary symptoms.  Reasonable to treat in elderly patient given clinical history.      - No culture data available, ucx contaminated.  WBC improving.  Recommend d/c rocephin after 3 day course for uncomplicated UTI on 11/13    - Monitor WBC and temp curve.  CT a/p no infectious focus.     OK to d/c from ID standpoint          Mariya Esposito  [phone]

## 2019-11-14 NOTE — PROGRESS NOTE ADULT - SUBJECTIVE AND OBJECTIVE BOX
Patient is a 79y old  Female who presents with a chief complaint of slurred speech, frequent falls (14 Nov 2019 14:06)      SUBJECTIVE / OVERNIGHT EVENTS: no new c/o    MEDICATIONS  (STANDING):  amLODIPine   Tablet 10 milliGRAM(s) Oral daily  aspirin enteric coated 81 milliGRAM(s) Oral daily  atorvastatin 10 milliGRAM(s) Oral at bedtime  enoxaparin Injectable 30 milliGRAM(s) SubCutaneous daily  escitalopram 20 milliGRAM(s) Oral daily  sodium chloride 0.9%. 1000 milliLiter(s) (75 mL/Hr) IV Continuous <Continuous>    MEDICATIONS  (PRN):      Vital Signs Last 24 Hrs  T(F): 98.2 (11-14-19 @ 12:39), Max: 98.2 (11-14-19 @ 12:39)  HR: 79 (11-14-19 @ 12:39) (62 - 79)  BP: 146/80 (11-14-19 @ 12:39) (146/80 - 157/80)  RR: 18 (11-14-19 @ 12:39) (17 - 18)  SpO2: 98% (11-14-19 @ 12:39) (96% - 99%)  Telemetry:   CAPILLARY BLOOD GLUCOSE        I&O's Summary    13 Nov 2019 07:01  -  14 Nov 2019 07:00  --------------------------------------------------------  IN: 3040 mL / OUT: 0 mL / NET: 3040 mL    14 Nov 2019 07:01  -  14 Nov 2019 15:28  --------------------------------------------------------  IN: 600 mL / OUT: 0 mL / NET: 600 mL        PHYSICAL EXAM:  GENERAL: NAD, well-developed  HEAD:  Atraumatic, Normocephalic  EYES: EOMI, PERRLA, conjunctiva and sclera clear  NECK: Supple, No JVD  CHEST/LUNG: Clear to auscultation bilaterally; No wheeze  HEART: Regular rate and rhythm; No murmurs, rubs, or gallops  ABDOMEN: Soft, Nontender, Nondistended; Bowel sounds present  EXTREMITIES:  2+ Peripheral Pulses, No clubbing, cyanosis, or edema  PSYCH: AAOx3  NEUROLOGY: non-focal  SKIN: No rashes or lesions    LABS:                        11.5   9.16  )-----------( 215      ( 14 Nov 2019 08:19 )             34.9     11-14    141  |  105  |  5<L>  ----------------------------<  93  3.3<L>   |  25  |  0.81    Ca    9.1      14 Nov 2019 06:26        CARDIAC MARKERS ( 14 Nov 2019 06:26 )  x     / x     / 146 U/L / x     / x              RADIOLOGY & ADDITIONAL TESTS:    Imaging Personally Reviewed:    Consultant(s) Notes Reviewed:      Care Discussed with Consultants/Other Providers:

## 2019-11-14 NOTE — SWALLOW VFSS/MBS ASSESSMENT ADULT - COMMENTS
See addendum in bedside swallow evaluation for hospital course.  IMAGING HISTORY:  CT Chest & A/P (11/10): No acute traumatic injury in the chest abdomen or pelvis. Bilateral lower lobe subsegmental atelectasis.  Ct Head (11/10): There is no acute intracranial hemorrhage, mass effect, or midline shift. CT Cervical Spine: No acute displaced cervical spine fracture or evidence of traumatic malalignment.  EKG (11/10): Normal sinus rhythm. Possible left atrial enlargement. Left ventricular hypertrophy. Possible lateral infarct, age undetermined. Abnormal ECG.  MRI Angio Head & Neck (11/11): Redemonstration of volume loss, microvascular disease, no obvious restricted diffusion, hemorrhage or midline shift. Calcification and mild stenosis of the cavernous and super clinoid ICA segments. Patent proximal and middle cerebral arteries, fetal PCAs, with stenosis of the distal anterior, middle, and posterior cerebral artery branch vessels. Dominant anterior circulation, with hypoplastic poorly seen posterior circulation, with a dominant intradural right vertebral artery, and poorly seen intradural left vertebral artery with possible stenoses proximal to the vertebrobasilar junction. Motion limited assessment of extra cranial circulation, patent bilateral common and internal carotid arteries, dominant right and hypoplastic left vertebral artery limited assessment of the ICA origins, suggest repeat imaging when patient clinically able.  US VA Duplex (11/11): No hemodynamically significant stenosis of either internal carotid artery. Flow-limiting stenosis in the right external carotid artery.  Transthroacic echo (11/12): Normal left ventricular internal dimensions and wall thicknesses. Normal left ventricular systolic function. No segmental wall motion abnormalities. Mild diastolic dysfunction (Stage I). Normal right ventricular size and function.

## 2019-11-14 NOTE — PROGRESS NOTE ADULT - ASSESSMENT
80 yo woman w progressive worsening in function w memory loss, frequent falls, dysarthria, house unkept, not clear if taking her meds, ptn is unkept, has multiple echymoses  PLAN:   MRI/MRA H/N not suggestive of recent CVA, ptn AND HCP refuse LP   Neuro consult and input aprpeciated  dysarthria, improved, prob post Rx for UTI  questionable dysphagia, bedside S&S eval done,  MBS done, results pending  ptn refused  ENT consult to R/O vocal cord paralysis  Carotid duplex is benign  Rhabdomyolysis has resolved   nl TSH, Vit B12, Folate, neg RPR  UA c/w UTI, urine culture is  polymicrobial, completed course of ABx  EKG w LVH and possible lateral infarct, TTE is benign  BP elevated on Norvasc 10 mg daily, add Losartan 25 mg daily  PT eval done, recommend MARTHA Parks is now her HCP form on the chart,  she has looked at multiple assisted living and it appears ptn is ready for that transition  GOC d/w ptn/niece, she is full code  DVT ppx w sc Lovenox

## 2019-11-14 NOTE — PROGRESS NOTE ADULT - SUBJECTIVE AND OBJECTIVE BOX
Subjective: Patient seen and examined. No new events except as noted.   resting comfortably   no cp or sob       REVIEW OF SYSTEMS:    CONSTITUTIONAL:+ weakness, fevers or chills  EYES/ENT: No visual changes;  No vertigo or throat pain   NECK: No pain or stiffness  RESPIRATORY: No cough, wheezing, hemoptysis; No shortness of breath  CARDIOVASCULAR: No chest pain or palpitations  GASTROINTESTINAL: No abdominal or epigastric pain. No nausea, vomiting, or hematemesis; No diarrhea or constipation. No melena or hematochezia.  GENITOURINARY: No dysuria, frequency or hematuria  NEUROLOGICAL: No numbness or weakness  SKIN: No itching, burning, rashes, or lesions   All other review of systems is negative unless indicated above.    MEDICATIONS:  MEDICATIONS  (STANDING):  amLODIPine   Tablet 10 milliGRAM(s) Oral daily  aspirin enteric coated 81 milliGRAM(s) Oral daily  atorvastatin 10 milliGRAM(s) Oral at bedtime  cefTRIAXone   IVPB      cefTRIAXone   IVPB 1000 milliGRAM(s) IV Intermittent every 24 hours  enoxaparin Injectable 30 milliGRAM(s) SubCutaneous daily  escitalopram 20 milliGRAM(s) Oral daily  sodium chloride 0.9%. 1000 milliLiter(s) (75 mL/Hr) IV Continuous <Continuous>      PHYSICAL EXAM:  T(C): 36.7 (11-14-19 @ 04:53), Max: 36.7 (11-14-19 @ 04:53)  HR: 62 (11-14-19 @ 04:53) (62 - 74)  BP: 149/85 (11-14-19 @ 04:53) (149/85 - 157/80)  RR: 18 (11-14-19 @ 04:53) (17 - 18)  SpO2: 96% (11-14-19 @ 04:53) (96% - 99%)  Wt(kg): --  I&O's Summary    13 Nov 2019 07:01  -  14 Nov 2019 07:00  --------------------------------------------------------  IN: 3040 mL / OUT: 0 mL / NET: 3040 mL          Appearance: NAD  HEENT:   Normal oral mucosa, PERRL, EOMI	  Lymphatic: No lymphadenopathy , no edema  Cardiovascular: Normal S1 S2, No JVD, No murmurs , Peripheral pulses palpable 2+ bilaterally  Respiratory: Lungs clear to auscultation, normal effort 	  Gastrointestinal:  Soft, Non-tender, + BS	  Skin: No rashes, No ecchymoses, No cyanosis, warm to touch  Musculoskeletal: Normal range of motion, normal strength  Psychiatry:  Mood & affect appropriate  Ext: No edema      LABS:    CARDIAC MARKERS:  CARDIAC MARKERS ( 14 Nov 2019 06:26 )  x     / x     / 146 U/L / x     / x      CARDIAC MARKERS ( 12 Nov 2019 07:22 )  x     / x     / 539 U/L / x     / x                                    11.5   9.16  )-----------( 215      ( 14 Nov 2019 08:19 )             34.9     11-14    141  |  105  |  5<L>  ----------------------------<  93  3.3<L>   |  25  |  0.81    Ca    9.1      14 Nov 2019 06:26      proBNP:   Lipid Profile:   HgA1c:   TSH:             TELEMETRY: 	    ECG:  	  RADIOLOGY:   < from: VA Duplex CarotidBipin (11.11.19 @ 09:48) >    EXAM:  CAROTID DUPLEX BILATERAL                            PROCEDURE DATE:  11/11/2019            INTERPRETATION:  CLINICAL INFORMATION: Dysarthria. Concern for carotid   stenosis.    TECHNIQUE: Grayscale, color and spectral Doppler examination of both   carotid arteries was performed.     COMPARISON: None.    FINDINGS:    There is intimal thickening involving the bilateral carotid bifurcations.    There is a flow-limiting stenosis in the proximal right external carotid   artery.    Peak systolic velocities in the carotid arteries are as follows:    RIGHT:    PROX CCA = 117 cm/s ;  DIST CCA =101 cm/s ;  PROX ICA = 86 cm/s   ;  DIST ICA = 87 cm/s ;  ECA = 163 cm/s    LEFT  :    PROX CCA = 123 cm/s ;  DIST CCA =102 cm/s ;  PROX ICA = 50   cm/s ;  DIST ICA = 74 cm/s ;  ECA = 123 cm/s    There is normal antegrade flow in the bilateral vertebral arteries.      IMPRESSION:    No hemodynamically significant stenosis of either internal carotid artery.    Flow-limiting stenosis in the right external carotid artery.      Measurement of carotid stenosis is based on velocity parameters that   correlate the residual internal carotid diameter with that of the more   distal vessel in accordance with a method such as the [place] American   Symptomatic Carotid Endarterectomy Trial (NASCET).                      ANÍBAL BOND M.D., RADIOLOGY RESIDENT  This document has been electronically signed.  AMI GARCIA M.D., ATTENDING RADIOLOGIST  This document has been electronically signed. Nov 11 2019 10:43AM                < end of copied text >    DIAGNOSTIC TESTING:  [ ] Echocardiogram:  < from: Transthoracic Echocardiogram (11.12.19 @ 07:58) >    Patient name: CLAUS JOHNS  YOB: 1940   Age: 79 (F)   MR#: [id number]  Study Date: 11/12/2019  [facility] Farheen Am[facility] RDCS  Study quality: Technically fair  Referring Physician: Aleksandra Walden MD  Blood Pressure: 150/76 mmHg  Height: 155 cm  Weight: 66 kg  BSA: 1.7 m2  ------------------------------------------------------------------------  PROCEDURE: Transthoracic echocardiogram with 2-D, M-Mode  and complete spectral and color flow Doppler.  INDICATION: Syncope and collapse (R55)  ------------------------------------------------------------------------  Dimensions:    Normal Values:  LA:     3.3    2.0 - 4.0 cm  Ao:     2.8    2.0 - 3.8 cm  SEPTUM: 0.8    0.6 - 1.2 cm  PWT:    0.8    0.6 - 1.1 cm  LVIDd:  3.6    3.0 - 5.6 cm  LVIDs:  2.1    1.8 - 4.0 cm  Derived variables:  LVMI: 48 g/m2  RWT: 0.44  Fractional short: 42 %  EF (Modified Garcia Rule): 69 %  ------------------------------------------------------------------------  Observations:  Mitral Valve: Normal mitral valve.  Aortic Valve/Aorta: Calcified trileaflet aortic valve with  normal opening.  Aortic Root: 2.8 cm.  Left Atrium: Normal left atrium.  LA volume index = 15  cc/m2.  Left Ventricle: Normal left ventricular systolic function.  No segmental wall motion abnormalities. Normal left  ventricular internal dimensions and wall thicknesses. Mild  diastolic dysfunction (Stage I).  Right Heart: Normal right atrium. Normal right ventricular  size and function. Normal tricuspid valve. Normal pulmonic  valve.  Pericardium/Pleura: Normal pericardium with no pericardial  effusion.  Hemodynamic: Estimated right atrial pressure is 8 mm Hg.  ------------------------------------------------------------------------  Conclusions:  1. Normal left ventricular internal dimensions and wall  thicknesses.  2. Normal left ventricular systolic function. No segmental  wall motion abnormalities.  3. Mild diastolic dysfunction (Stage I).  4. Normal right ventricular size and function.  ------------------------------------------------------------------------  Confirmed on  11/12/2019 - 12:08:39 by Nickie Mckeon M.D.  ------------------------------------------------------------------------    < end of copied text >    [ ]  Catheterization:  [ ] Stress Test:    OTHER:

## 2019-11-14 NOTE — DISCHARGE NOTE PROVIDER - HOSPITAL COURSE
80 y/o woman with PMHx HTN, forgetfulness but still drives, hypothyroid but not on synthroid BIBEMS for unwitnessed fall. Per niece and sister at bedside, patient lives alone, and for the last "couple" of months developed dysarthria and had been getting progressively more forgetful for the last 6 months or so. Ptns house is unkept and for the past 2 months ptn has been unkept. Ptn has been resistant to see a doctor. Never evaluated for stroke, never evaluated for dementia. She does not have any children and she is , and does not have assistance with ADLs. Sister is next of kin. But while ptn is being evaluated she named her niece her HCP.     Last evening, patient called niece's residence. She is unaccounted for until this AM around 10, when sister called her about [facility] and learned she had fallen down. A neighbor called 911. Patient has no memory of what happened. Fall was unwitnessed, unknown if there was LOC. Denies pain, however, poor historian. Ptn has no insight into her progressive worsening function.  No fever, chills, chest pain, SOB, n/v/d. Unclear if patient has been able to take medications as prescribed.              INCOMPLETE

## 2019-11-14 NOTE — SWALLOW VFSS/MBS ASSESSMENT ADULT - RECOMMENDED FEEDING/EATING TECHNIQUES
allow for swallow between intakes/maintain upright posture during/after eating for 30 mins/alternate food with liquid/no straws/small sips/bites

## 2019-11-14 NOTE — SWALLOW VFSS/MBS ASSESSMENT ADULT - DIAGNOSTIC IMPRESSIONS
Patient presents with oropharyngeal dysphagia characterized by prolonged, but efficient, mastication of soft solids, decreased AP transport resulting in oral residue which reduces with repeat swallow/liquid wash, post swallow pharyngeal residue with solids which reduces with liquid wash, and laryngeal penetration before the swallow with subsequent silent aspiration of thin liquids. A chin tuck was successful in maintaining airway protection, however, would suggest training in rehab setting prior to independent implementation due to baseline cognitive deficits.    Disorders: reduced lingual strength/ROM/Rate of motion, reduced BOT to posterior pharyngeal wall contact, reduced laryngeal closure, reduced pharyngeal contractility, reduced supraglottic sensation, reduced subglottic sensation.

## 2019-11-14 NOTE — DISCHARGE NOTE PROVIDER - NSDCMRMEDTOKEN_GEN_ALL_CORE_FT
amLODIPine 10 mg oral tablet: 1 tab(s) orally once a day  aspirin 81 mg oral delayed release tablet: 1 tab(s) orally once a day  atorvastatin 10 mg oral tablet: 1 tab(s) orally once a day (at bedtime)  escitalopram 20 mg oral tablet: 1 tab(s) orally once a day amLODIPine 10 mg oral tablet: 1 tab(s) orally once a day  aspirin 81 mg oral delayed release tablet: 1 tab(s) orally once a day  atorvastatin 10 mg oral tablet: 1 tab(s) orally once a day (at bedtime)  escitalopram 20 mg oral tablet: 1 tab(s) orally once a day  losartan 25 mg oral tablet: 1 tab(s) orally once a day

## 2019-11-14 NOTE — DISCHARGE NOTE PROVIDER - NSDCCPCAREPLAN_GEN_ALL_CORE_FT
PRINCIPAL DISCHARGE DIAGNOSIS  Diagnosis: Acute UTI  Assessment and Plan of Treatment: You have completed antibiotic treatment.   Follow up with your healthcare provider in one week. Call for an appointment.   If you develop a fever, burning on urination, difficulty voiding, call your healthcare provider.      SECONDARY DISCHARGE DIAGNOSES  Diagnosis: Dysphagia  Assessment and Plan of Treatment: You've had a modified barium swallow evaluation done today.  You were    Diagnosis: Speech finding  Assessment and Plan of Treatment: Speech therapy will be offered in Rehab    Diagnosis: Failure to thrive in adult  Assessment and Plan of Treatment: Failure to thrive in adult    Diagnosis: Hypothyroidism, unspecified type  Assessment and Plan of Treatment: Continue with Levothyroxine as prescribed.    Diagnosis: Hypertension, unspecified type  Assessment and Plan of Treatment: Low salt diet  Activity as tolerated.  Take all medication as prescribed.  Follow up with your medical doctor for routine blood pressure monitoring at your next visit.  Notify your doctor if you have any of the following symptoms:   Dizziness, Lightheadedness, Blurry vision, Headache, Chest pain, Shortness of breath PRINCIPAL DISCHARGE DIAGNOSIS  Diagnosis: Acute UTI  Assessment and Plan of Treatment: You have completed antibiotic treatment.   Follow up with your healthcare provider in one week. Call for an appointment.   If you develop a fever, burning on urination, difficulty voiding, call your healthcare provider.      SECONDARY DISCHARGE DIAGNOSES  Diagnosis: Dysphagia  Assessment and Plan of Treatment: You've had a modified barium swallow evaluation done today.  Your diet was upgraded to soft with nectar thick consistency fluids.    Diagnosis: Speech finding  Assessment and Plan of Treatment: Speech therapy will be offered in Rehab    Diagnosis: Hypothyroidism, unspecified type  Assessment and Plan of Treatment: Continue with Levothyroxine as prescribed.    Diagnosis: Hypertension, unspecified type  Assessment and Plan of Treatment: Low salt diet  Activity as tolerated.  Take all medication as prescribed.  Follow up with your medical doctor for routine blood pressure monitoring at your next visit.  Notify your doctor if you have any of the following symptoms:   Dizziness, Lightheadedness, Blurry vision, Headache, Chest pain, Shortness of breath PRINCIPAL DISCHARGE DIAGNOSIS  Diagnosis: Acute UTI  Assessment and Plan of Treatment: You have completed antibiotic treatment.   Follow up with your healthcare provider in one week. Call for an appointment.   If you develop a fever, burning on urination, difficulty voiding, call your healthcare provider.      SECONDARY DISCHARGE DIAGNOSES  Diagnosis: Dysphagia  Assessment and Plan of Treatment: You've had a modified barium swallow evaluation done today.  Your diet was upgraded to soft with nectar thick consistency fluids.    Diagnosis: Speech finding  Assessment and Plan of Treatment: Speech therapy will be offered in Rehab    Diagnosis: Falls  Assessment and Plan of Treatment: You are being discharged to Rehab for physical therapy.  Use device to assist with walking such as cane and/or walker.  Use non skid rugs in your home.  Try to avoid clutter in your home    Diagnosis: Hypothyroidism, unspecified type  Assessment and Plan of Treatment: Continue with Levothyroxine as prescribed.    Diagnosis: Hypertension, unspecified type  Assessment and Plan of Treatment: Low salt diet  Activity as tolerated.  Take all medication as prescribed.  Follow up with your medical doctor for routine blood pressure monitoring at your next visit.  Notify your doctor if you have any of the following symptoms:   Dizziness, Lightheadedness, Blurry vision, Headache, Chest pain, Shortness of breath

## 2019-11-14 NOTE — PROGRESS NOTE ADULT - REASON FOR ADMISSION
slurred speech, frequent falls

## 2019-11-14 NOTE — CHART NOTE - NSCHARTNOTEFT_GEN_A_CORE
Nutrition Follow Up Note  Patient seen for: severe malnutrition follow up. Chart reviewed and events noted. Pt has a bedside swallow assessment on . SLP recommended a dysphagia 2 with nectar-thickened liquids and a MBS. As per   infectious disease, pt with uncomplicated UTI. As per cardiology pt's hypertension is currently stable. Pt received a MBS and is awaiting the results ().    Source:   EMR and RN    Diet :   Dysphagia 2 Mechanical Soft-Nectar Consistency Fluid, DASH/TLC {Sodium & Cholesterol Restricted}    Patient reports:  Although pt states her PO intake has been good, per RN pt has been a "picky eater" with fair intake since admission. Pt's intake varies on the meal and the day. Review of breakfast tray shows 75% intake. Per RN last BM .      PO intake :  ~50%.      Source for PO intake:  RN    Daily Weight in k.2 (-), Weight in k.7 (-)  % Weight Change, 8.2% wt increase    Pertinent Medications: Lipitor, potassium chloride solution     Pertinent Labs: () BUN 5<L>, K+ 3.3<L>,    Skin per nursing documentation: Wound to left elbow and IAD to perineum.  Edema: +1 B/L foot and ankle    Estimated Needs:   [x] no change since previous assessment  [ ] recalculated:   Based on dosing wt of 145 pounds:  3121-2733 kcals (20-25 kcal/kg)  65.7-78.8 grams of protein (1.0-1.2 g/kg)      Previous Nutrition Diagnosis: severe chronic malnutrition   Nutrition Diagnosis is: ongoing. Being addressed with supplementation. Nutrition care plan in progress.     New Nutrition Diagnosis: n/a  Related to: n/a   As evidenced by: n/a     Interventions:     Recommend  1)Awaiting MBS evaluation for changes to diet consistency.   2) Continue Dysphagia 2 Mechanical Soft-Nectar Consistency Fluid, DASH diet.  3) Recommend Mighty Shake twice a day.   4) Assist with meal ordering as needed.    Monitoring and Evaluation:     Continue to monitor Nutritional intake, Tolerance to diet prescription, weights, labs, skin integrity    RD remains available upon request and will follow up per protocol Nutrition Follow Up Note  Patient seen for: severe malnutrition follow up. Chart reviewed and events noted. Pt has a bedside swallow assessment on . SLP recommended a dysphagia 2 with nectar-thickened liquids and a MBS. As per infectious disease, pt with uncomplicated UTI. As per cardiology pt's hypertension is currently stable. Pt received a MBS and is awaiting the results ().    Source:   EMR and RN    Diet :   Dysphagia 2 Mechanical Soft-Nectar Consistency Fluid, DASH/TLC {Sodium & Cholesterol Restricted}    Patient reports:  Although pt states her PO intake has been good, per RN pt has been a "picky eater" with fair intake since admission. Pt's intake varies on the meal and day. Review of breakfast tray shows 75% intake, however lunch tray was <50%. Food preferences were obtained to optimize intake. Per RN last BM .      PO intake :  ~50%.      Source for PO intake:  RN    Daily Weight in k.2 (-), Weight in k.7 (-12)  % Weight Change, 8.2% wt increase    Pertinent Medications: Lipitor, potassium chloride solution     Pertinent Labs: () BUN 5<L>, K+ 3.3<L>,    Skin per nursing documentation: Wound to left elbow and IAD to perineum.  Edema: +1 B/L foot and ankle    Estimated Needs:   [x] no change since previous assessment  [ ] recalculated:   Based on dosing wt of 145 pounds:  5320-3088 kcals (20-25 kcal/kg)  65.7-78.8 grams of protein (1.0-1.2 g/kg)      Previous Nutrition Diagnosis: severe chronic malnutrition   Nutrition Diagnosis is: ongoing. Being addressed with supplementation. Nutrition care plan in progress.     New Nutrition Diagnosis: n/a  Related to: n/a   As evidenced by: n/a     Interventions:     Recommend  1) Awaiting MBS evaluation for changes to diet consistency.   2) Continue Dysphagia 2 Mechanical Soft-Nectar Consistency Fluid, DASH diet.  3) Recommend Mighty Shake twice a day.   4) Assist with meal ordering as needed.    Monitoring and Evaluation:     Continue to monitor Nutritional intake, Tolerance to diet prescription, weights, labs, skin integrity    RD remains available upon request and will follow up per protocol Nutrition Follow Up Note  Patient seen for: severe malnutrition follow up. Chart reviewed and events noted. Pt has a bedside swallow assessment on . SLP recommended a dysphagia 2 with nectar-thickened liquids and a MBS. As per infectious disease, pt with uncomplicated UTI. As per cardiology pt's hypertension is currently stable. Pt received a MBS (), recommended Dysphagia III with nectar thick liquids.    Source:   EMR and RN    Diet :   Dysphagia 2 Mechanical Soft-Nectar Consistency Fluid, DASH/TLC {Sodium & Cholesterol Restricted}    Patient reports:  Although pt states her PO intake has been good, per RN pt has been a "picky eater" with fair intake since admission. Pt's intake varies on the meal and day. Review of breakfast tray shows 75% intake, however lunch tray was <50%. RN is unaware if pt is drinking the Mighty Shakes and pt is not able to recall. Food preferences were obtained to optimize intake. Per RN last BM .      PO intake :  ~50%.      Source for PO intake:  RN    Daily Weight in k.2 (), Weight in k.7 (-)  % Weight Change, 8.2% wt increase    Pertinent Medications: Lipitor, potassium chloride solution     Pertinent Labs: () BUN 5<L>, K+ 3.3<L>,    Skin per nursing documentation: Wound to left elbow and IAD to perineum.  Edema: +1 B/L foot and ankle    Estimated Needs:   [x] no change since previous assessment  [ ] recalculated:   Based on dosing wt of 145 pounds:  2426-5464 kcals (20-25 kcal/kg)  65.7-78.8 grams of protein (1.0-1.2 g/kg)      Previous Nutrition Diagnosis: severe chronic malnutrition   Nutrition Diagnosis is: ongoing. Being addressed with supplementation. Nutrition care plan in progress.     New Nutrition Diagnosis: n/a  Related to: n/a   As evidenced by: n/a     Interventions:     Recommend  1) Change diet to 3 Soft-Nectar Consistency Fluid, DASH diet as per SLP recommendation.   3) Continue with Mighty Shakes twice a day.   4) Assist with meal ordering as needed.    Monitoring and Evaluation:     Continue to monitor Nutritional intake, Tolerance to diet prescription, weights, labs, skin integrity    RD remains available upon request and will follow up per protocol Nutrition Follow Up Note  Patient seen for: severe malnutrition follow up. Chart reviewed and events noted. Pt had a bedside swallow assessment on  and a MBS on , recommended Dysphagia III soft with nectar thick liquids. As per infectious disease, pt with uncomplicated UTI. As per cardiology pt's hypertension is currently stable.     Source:   EMR and RN    Diet :   Dysphagia 2 Mechanical Soft-Nectar Consistency Fluid, DASH/TLC {Sodium & Cholesterol Restricted}    Patient reports:  Although pt states her PO intake has been good, per RN pt has been a "picky eater" with fair intake since admission. Pt's intake varies on the meal and day. Review of breakfast tray shows 75% intake, however lunch tray was <50%. RN is unaware if pt is drinking the Mighty Shakes and pt is not able to recall. Food preferences were obtained to optimize intake. Per RN last BM .      PO intake :  ~50%.      Source for PO intake:  RN    Daily Weight in k.2 (-), Weight in k.7 (-12)  % Weight Change, 8.2% wt increase    Pertinent Medications: Lipitor, potassium chloride solution     Pertinent Labs: () BUN 5<L>, K+ 3.3<L>,    Skin per nursing documentation: Wound to left elbow and IAD to perineum.  Edema: +1 B/L foot and ankle    Estimated Needs:   [x] no change since previous assessment  [ ] recalculated:   Based on dosing wt of 145 pounds:  4170-9223 kcals (20-25 kcal/kg)  65.7-78.8 grams of protein (1.0-1.2 g/kg)      Previous Nutrition Diagnosis: severe chronic malnutrition   Nutrition Diagnosis is: ongoing. Being addressed with supplementation. Nutrition care plan in progress.     New Nutrition Diagnosis: n/a  Related to: n/a   As evidenced by: n/a     Interventions:     Recommend  1) Change diet to 3 Soft-Nectar Consistency Fluid, DASH diet as per SLP recommendation.   3) Continue with Mighty Shakes twice a day.   4) Assist with meal ordering as needed.    Monitoring and Evaluation:     Continue to monitor Nutritional intake, Tolerance to diet prescription, weights, labs, skin integrity    RD remains available upon request and will follow up per protocol

## 2019-11-14 NOTE — PROGRESS NOTE ADULT - SUBJECTIVE AND OBJECTIVE BOX
Infectious Diseases progress note:    Subjective:  NAD, afebrile, no new complaints.     ROS:  CONSTITUTIONAL:  No fever, chills, rigors  CARDIOVASCULAR:  No chest pain or palpitations  RESPIRATORY:   No SOB, cough, dyspnea on exertion.  No wheezing  GASTROINTESTINAL:  No abd pain, N/V, diarrhea/constipation  EXTREMITIES:  No swelling or joint pain  GENITOURINARY:  No burning on urination, increased frequency or urgency.  No flank pain  NEUROLOGIC:  No HA, visual disturbances  SKIN: No rashes    Allergies    No Known Allergies    Intolerances        ANTIBIOTICS/RELEVANT:  antimicrobials    immunologic:    OTHER:  amLODIPine   Tablet 10 milliGRAM(s) Oral daily  aspirin enteric coated 81 milliGRAM(s) Oral daily  atorvastatin 10 milliGRAM(s) Oral at bedtime  enoxaparin Injectable 30 milliGRAM(s) SubCutaneous daily  escitalopram 20 milliGRAM(s) Oral daily  losartan 25 milliGRAM(s) Oral daily  sodium chloride 0.9%. 1000 milliLiter(s) IV Continuous <Continuous>      Objective:  Vital Signs Last 24 Hrs  T(C): 36.2 (14 Nov 2019 16:21), Max: 36.8 (14 Nov 2019 12:39)  T(F): 97.2 (14 Nov 2019 16:21), Max: 98.2 (14 Nov 2019 12:39)  HR: 77 (14 Nov 2019 16:21) (62 - 79)  BP: 156/78 (14 Nov 2019 16:21) (146/80 - 156/78)  BP(mean): --  RR: 18 (14 Nov 2019 16:21) (18 - 18)  SpO2: 98% (14 Nov 2019 16:21) (96% - 98%)    PHYSICAL EXAM:  Constitutional:NAD  Eyes:LIZETH, EOMI  Ear/Nose/Throat: no thrush, mucositis.  Moist mucous membranes	  Neck:no JVD, no lymphadenopathy, supple  Respiratory: CTA maggie  Cardiovascular: S1S2 RRR, no murmurs  Gastrointestinal:soft, nontender,  nondistended (+) BS  Extremities:no e/e/c  Skin:  no rashes, open wounds or ulcerations        LABS:                        11.5   9.16  )-----------( 215      ( 14 Nov 2019 08:19 )             34.9     11-14    141  |  105  |  5<L>  ----------------------------<  93  3.3<L>   |  25  |  0.81    Ca    9.1      14 Nov 2019 06:26        MICROBIOLOGY:    Culture - Urine (11.10.19 @ 23:06)    Specimen Source: .Urine Clean Catch (Midstream)    Culture Results:   >=3 organisms. Probable collection contamination.    Culture - Blood (11.10.19 @ 22:36)    Specimen Source: .Blood Blood-Venous    Culture Results:   No growth to date.    Culture - Blood (11.10.19 @ 22:32)    Specimen Source: .Blood Blood-Peripheral    Culture Results:   No growth to date.          RADIOLOGY & ADDITIONAL STUDIES:    < from: MR Angio Neck No Cont (11.11.19 @ 19:14) >  IMPRESSION:    Redemonstration of volume loss, microvascular disease, no obvious   restricted diffusion, hemorrhage or midline shift.    Calcification and mild stenosis of the cavernous and super clinoid ICA   segments.Patent proximal and middle cerebral arteries, fetal PCAs, with   stenosis of the distal anterior, middle, and posterior cerebral artery   branch vessels.    Dominant anterior circulation, with hypoplastic poorly seen posterior   circulation, with a dominant intradural right vertebral artery, and   poorly seen intradural left vertebral artery with possible stenoses   proximal to the vertebrobasilar junction.    Motion limited assessment of extra cranial circulation, patent bilateral   common and internal carotid arteries, dominant right and hypoplastic left   vertebral artery limited assessment of the ICA origins, suggest repeat   imaging when patient clinically able.    < end of copied text >

## 2019-11-14 NOTE — SWALLOW VFSS/MBS ASSESSMENT ADULT - ADDITIONAL INFORMATION
+dental hardware  +calcification of the arytenoids  ?prominent cricopharyngeus muscle - nonobstructive

## 2019-11-14 NOTE — SWALLOW VFSS/MBS ASSESSMENT ADULT - ORAL PHASE COMMENTS
Oral cavity residue reduced with repeat swallow/liquid wash. Oral cavity residue reduced with repeat swallow. Pt with reflexive swallow in response to penetration event with subsequent aspiration of same. Oral cavity residue reduced with liquid wash/repeat swallow.

## 2019-11-14 NOTE — PROGRESS NOTE ADULT - ASSESSMENT
80 yo woman w progressive worsening in function w memory loss, frequent falls, dysarthria, house unkept, not clear if taking her meds, ptn is unkept, has multiple echymoses

## 2019-11-14 NOTE — SWALLOW VFSS/MBS ASSESSMENT ADULT - ORAL PHASE
Residue in oral cavity/Reduced anterior - posterior transport Reduced anterior - posterior transport/Residue in oral cavity Trace/mild laryngeal penetration before the swallow with small portion of bolus; the majority of the bolus remained in the oral cavity.

## 2019-11-14 NOTE — SWALLOW VFSS/MBS ASSESSMENT ADULT - ADDITIONAL RECOMMENDATIONS
Maintain good oral hygiene.  Trial of restorative swallow therapy post d/c in rehab setting.  Trials of thin liquids in chin tuck position with SLP only.

## 2019-11-14 NOTE — SWALLOW VFSS/MBS ASSESSMENT ADULT - SLP GENERAL OBSERVATIONS
Pt received awake and alert, upright and secure in KULWINDER chair, on RA, +dysarthria. Able to follow commands for evaluation purposes with moderate verbal cues/reminders.

## 2019-11-15 LAB
CULTURE RESULTS: SIGNIFICANT CHANGE UP
CULTURE RESULTS: SIGNIFICANT CHANGE UP
SPECIMEN SOURCE: SIGNIFICANT CHANGE UP
SPECIMEN SOURCE: SIGNIFICANT CHANGE UP

## 2019-11-16 LAB — MUSK IGG SER IA-MCNC: 0 NMOL/L — SIGNIFICANT CHANGE UP (ref 0–0.02)

## 2019-11-22 LAB — ACRM MODULATING ANTIBODY: 0 NMOL/L — SIGNIFICANT CHANGE UP

## 2020-07-07 ENCOUNTER — EMERGENCY (EMERGENCY)
Facility: HOSPITAL | Age: 80
LOS: 1 days | Discharge: HOME | End: 2020-07-07
Attending: EMERGENCY MEDICINE | Admitting: EMERGENCY MEDICINE
Payer: MEDICARE

## 2020-07-07 VITALS
TEMPERATURE: 98 F | OXYGEN SATURATION: 100 % | DIASTOLIC BLOOD PRESSURE: 60 MMHG | RESPIRATION RATE: 18 BRPM | HEART RATE: 70 BPM | SYSTOLIC BLOOD PRESSURE: 135 MMHG

## 2020-07-07 VITALS
HEART RATE: 60 BPM | DIASTOLIC BLOOD PRESSURE: 68 MMHG | OXYGEN SATURATION: 100 % | TEMPERATURE: 98 F | RESPIRATION RATE: 16 BRPM | SYSTOLIC BLOOD PRESSURE: 113 MMHG

## 2020-07-07 DIAGNOSIS — Z90.49 ACQUIRED ABSENCE OF OTHER SPECIFIED PARTS OF DIGESTIVE TRACT: Chronic | ICD-10-CM

## 2020-07-07 LAB
ALBUMIN SERPL ELPH-MCNC: 3.5 G/DL — SIGNIFICANT CHANGE UP (ref 3.3–5)
ALP SERPL-CCNC: 69 U/L — SIGNIFICANT CHANGE UP (ref 40–120)
ALT FLD-CCNC: 17 U/L — SIGNIFICANT CHANGE UP (ref 4–33)
ANION GAP SERPL CALC-SCNC: 12 MMO/L — SIGNIFICANT CHANGE UP (ref 7–14)
APPEARANCE UR: CLEAR — SIGNIFICANT CHANGE UP
AST SERPL-CCNC: 24 U/L — SIGNIFICANT CHANGE UP (ref 4–32)
BACTERIA # UR AUTO: NEGATIVE — SIGNIFICANT CHANGE UP
BASOPHILS # BLD AUTO: 0.04 K/UL — SIGNIFICANT CHANGE UP (ref 0–0.2)
BASOPHILS NFR BLD AUTO: 0.4 % — SIGNIFICANT CHANGE UP (ref 0–2)
BILIRUB SERPL-MCNC: 0.7 MG/DL — SIGNIFICANT CHANGE UP (ref 0.2–1.2)
BILIRUB UR-MCNC: NEGATIVE — SIGNIFICANT CHANGE UP
BLOOD UR QL VISUAL: NEGATIVE — SIGNIFICANT CHANGE UP
BUN SERPL-MCNC: 13 MG/DL — SIGNIFICANT CHANGE UP (ref 7–23)
CALCIUM SERPL-MCNC: 9.7 MG/DL — SIGNIFICANT CHANGE UP (ref 8.4–10.5)
CHLORIDE SERPL-SCNC: 100 MMOL/L — SIGNIFICANT CHANGE UP (ref 98–107)
CO2 SERPL-SCNC: 24 MMOL/L — SIGNIFICANT CHANGE UP (ref 22–31)
COLOR SPEC: YELLOW — SIGNIFICANT CHANGE UP
CREAT SERPL-MCNC: 0.7 MG/DL — SIGNIFICANT CHANGE UP (ref 0.5–1.3)
EOSINOPHIL # BLD AUTO: 0.06 K/UL — SIGNIFICANT CHANGE UP (ref 0–0.5)
EOSINOPHIL NFR BLD AUTO: 0.6 % — SIGNIFICANT CHANGE UP (ref 0–6)
GLUCOSE SERPL-MCNC: 97 MG/DL — SIGNIFICANT CHANGE UP (ref 70–99)
GLUCOSE UR-MCNC: NEGATIVE — SIGNIFICANT CHANGE UP
HCT VFR BLD CALC: 38.6 % — SIGNIFICANT CHANGE UP (ref 34.5–45)
HGB BLD-MCNC: 12.9 G/DL — SIGNIFICANT CHANGE UP (ref 11.5–15.5)
HYALINE CASTS # UR AUTO: SIGNIFICANT CHANGE UP
IMM GRANULOCYTES NFR BLD AUTO: 0.4 % — SIGNIFICANT CHANGE UP (ref 0–1.5)
KETONES UR-MCNC: SIGNIFICANT CHANGE UP
LEUKOCYTE ESTERASE UR-ACNC: NEGATIVE — SIGNIFICANT CHANGE UP
LYMPHOCYTES # BLD AUTO: 2.62 K/UL — SIGNIFICANT CHANGE UP (ref 1–3.3)
LYMPHOCYTES # BLD AUTO: 24.5 % — SIGNIFICANT CHANGE UP (ref 13–44)
MCHC RBC-ENTMCNC: 28.3 PG — SIGNIFICANT CHANGE UP (ref 27–34)
MCHC RBC-ENTMCNC: 33.4 % — SIGNIFICANT CHANGE UP (ref 32–36)
MCV RBC AUTO: 84.6 FL — SIGNIFICANT CHANGE UP (ref 80–100)
MONOCYTES # BLD AUTO: 0.81 K/UL — SIGNIFICANT CHANGE UP (ref 0–0.9)
MONOCYTES NFR BLD AUTO: 7.6 % — SIGNIFICANT CHANGE UP (ref 2–14)
NEUTROPHILS # BLD AUTO: 7.12 K/UL — SIGNIFICANT CHANGE UP (ref 1.8–7.4)
NEUTROPHILS NFR BLD AUTO: 66.5 % — SIGNIFICANT CHANGE UP (ref 43–77)
NITRITE UR-MCNC: NEGATIVE — SIGNIFICANT CHANGE UP
NRBC # FLD: 0 K/UL — SIGNIFICANT CHANGE UP (ref 0–0)
PH UR: 7 — SIGNIFICANT CHANGE UP (ref 5–8)
PLATELET # BLD AUTO: 207 K/UL — SIGNIFICANT CHANGE UP (ref 150–400)
PMV BLD: 11.8 FL — SIGNIFICANT CHANGE UP (ref 7–13)
POTASSIUM SERPL-MCNC: 4.3 MMOL/L — SIGNIFICANT CHANGE UP (ref 3.5–5.3)
POTASSIUM SERPL-SCNC: 4.3 MMOL/L — SIGNIFICANT CHANGE UP (ref 3.5–5.3)
PROT SERPL-MCNC: 7.2 G/DL — SIGNIFICANT CHANGE UP (ref 6–8.3)
PROT UR-MCNC: 30 — SIGNIFICANT CHANGE UP
RBC # BLD: 4.56 M/UL — SIGNIFICANT CHANGE UP (ref 3.8–5.2)
RBC # FLD: 15 % — HIGH (ref 10.3–14.5)
RBC CASTS # UR COMP ASSIST: SIGNIFICANT CHANGE UP (ref 0–?)
SARS-COV-2 RNA SPEC QL NAA+PROBE: SIGNIFICANT CHANGE UP
SODIUM SERPL-SCNC: 136 MMOL/L — SIGNIFICANT CHANGE UP (ref 135–145)
SP GR SPEC: 1.02 — SIGNIFICANT CHANGE UP (ref 1–1.04)
SQUAMOUS # UR AUTO: SIGNIFICANT CHANGE UP
TROPONIN T, HIGH SENSITIVITY: 33 NG/L — SIGNIFICANT CHANGE UP (ref ?–14)
TROPONIN T, HIGH SENSITIVITY: 38 NG/L — SIGNIFICANT CHANGE UP (ref ?–14)
UROBILINOGEN FLD QL: HIGH
WBC # BLD: 10.69 K/UL — HIGH (ref 3.8–10.5)
WBC # FLD AUTO: 10.69 K/UL — HIGH (ref 3.8–10.5)
WBC UR QL: SIGNIFICANT CHANGE UP (ref 0–?)

## 2020-07-07 PROCEDURE — 99284 EMERGENCY DEPT VISIT MOD MDM: CPT | Mod: GC

## 2020-07-07 PROCEDURE — 71045 X-RAY EXAM CHEST 1 VIEW: CPT | Mod: 26

## 2020-07-07 PROCEDURE — 72125 CT NECK SPINE W/O DYE: CPT | Mod: 26

## 2020-07-07 PROCEDURE — 70450 CT HEAD/BRAIN W/O DYE: CPT | Mod: 26

## 2020-07-07 NOTE — PROVIDER CONTACT NOTE (OTHER) - BACKGROUND
Patient is a 80 year old, female from [facility], Assisted [facility] ([phone]).

## 2020-07-07 NOTE — ED ADULT NURSE REASSESSMENT NOTE - NS ED NURSE REASSESS COMMENT FT1
patient resting comfortably no complaints of pain at this time. fall precautions in place. laceration edges approximated no bleeding at this time. call bell in reach verbalizes understanding of use.

## 2020-07-07 NOTE — PROVIDER CONTACT NOTE (OTHER) - ASSESSMENT
Per Dr. Armijo patient medically cleared for discharge. LELIA spoke with Shira RENDON at [facility], Assisted Living ([phone]), stated once COVID-19 negative results are confirmed patient can return to assisted living.

## 2020-07-07 NOTE — ED PROVIDER NOTE - ATTENDING CONTRIBUTION TO CARE
Dr. Armijo: 81 yo female with HTN, dementia, hypothyroid, ?CVA in past with residual slurred speech, in ED with fall out of bed.  Pt lives in assisted living and has had multiple similar falls as per niece (HCP).  Was found on floor this morning.  Denies LOC.  No acute complaints besides abrasion to back of head and right elbow.  No CP/SOB or other complaints.  On exam pt chronically-ill appearing but in NAD, heart RRR, lungs CTAB, abd NTND, extremities without swelling, strength grossly intact in all extremities.  Left posterior occiput with abrasion but no open skin or laceration when cleaned.  Right elbow with abrasion but full ROM and nontender.  Midline cervical/thoracic/lumbosacral spine without bony TTP or step off.  No other signs of trauma.  Speech slurred, at baseline as per niece.

## 2020-07-07 NOTE — ED ADULT TRIAGE NOTE - CHIEF COMPLAINT QUOTE
Coming from [facility] s/p unwitnessed fall, found on floor this AM. AOx2 currently with slurred speech- at baseline as per NH staff- pt has hx of aphagia. Hit head, lac to L top of head. On baby ASA. Pt denies LOC. C/o b/l knee pain. Hx HTN, HLD. DNR- in chart from NH. Evalled by Dr Moe in triage; no stroke code called. Coming from [facility] s/p unwitnessed fall, found on floor at 720AM. AOx2 currently with slurred speech- at baseline as per NH staff- pt has hx of aphagia. Hit head, lac to L top of head. On baby ASA. Pt denies LOC. C/o b/l knee pain. Hx HTN, HLD. DNR- in chart from NH. Evalled by Dr Moe in triage; no stroke code called.

## 2020-07-07 NOTE — ED ADULT NURSE NOTE - INTERVENTIONS DEFINITIONS
Review medications for side effects contributing to fall risk/Reinforce activity limits and safety measures with patient and family/[place] to call system/Call bell, personal items and telephone within reach/Room bathroom lighting operational/Non-slip footwear when patient is off stretcher/Physically safe environment: no spills, clutter or unnecessary equipment/Instruct patient to call for assistance/Stretcher in lowest position, wheels locked, appropriate side rails in place/Provide visual clues: red socks/Monitor gait and stability

## 2020-07-07 NOTE — ED ADULT NURSE NOTE - CHIEF COMPLAINT QUOTE
Coming from [facility] s/p unwitnessed fall, found on floor at 720AM. AOx2 currently with slurred speech- at baseline as per NH staff- pt has hx of aphagia. Hit head, lac to L top of head. On baby ASA. Pt denies LOC. C/o b/l knee pain. Hx HTN, HLD. DNR- in chart from NH. Evalled by Dr Moe in triage; no stroke code called.

## 2020-07-07 NOTE — PROVIDER CONTACT NOTE (OTHER) - ASSESSMENT
Pt is cleared to return back to [facility] at  [address], [place] [address].  P/U 7.30. Tip# [id number]. Pt is cleared to return back to [facility] at  [address] [facility], [place] [address].  P/U 7.30. Tip# [id number]. As per Resident Provider pt is Covid negative. He spoke with Assisted Living staff and pt was accepted back. Pt is cleared to return back to [facility] living at  [address] [place] [address].  Arranged [facility] [phone]. P/U 7.30. Tip# [id number]. As per Resident Provider pt is Covid negative. He spoke with Assisted Living staff and pt was accepted back.

## 2020-07-07 NOTE — ED PROVIDER NOTE - PATIENT PORTAL LINK FT
You can access the FollowMyHealth Patient Portal offered by [facility] by registering at the following website: [email or web address]. By joining [employer]’s FollowMyHealth portal, you will also be able to view your health information using other applications (apps) compatible with our system.

## 2020-07-07 NOTE — ED PROVIDER NOTE - NSFOLLOWUPINSTRUCTIONS_ED_ALL_ED_FT
Thank you for visiting our Emergency Department, it has been a pleasure taking part in your healthcare. Please follow up with your primary doctor within x48 hours.    Your discharge diagnosis is: fall  Covid is negative.     Return precautions to the Emergency Department include but are not limited to: unrelenting nausea, vomiting, fever, chills, chest pain, shortness of breath, dizziness, abdominal pain, worsening pain, syncope, blood in urine or stool, headache that doesn't resolve, numbness or tingling, loss of sensation, loss of motor function, or any other concerning symptoms.

## 2020-07-07 NOTE — ED PROVIDER NOTE - PROGRESS NOTE DETAILS
Dr. Armijo: spoke to pt's niece Beth ([phone], POA/HCP), states that pt has had multiple episodes of slurred speech and ?TIA with falls, now has been slurring speech for 1 month; states that pt has expressed in past that if ill she does not want "anything done to keep her alive, just to be comfortable".  Discussed with niece whether or not pt would benefit from hospitalization, niece states that she thinks best thing for pt is to go back to [facility], where she feels she is being well taken care of Dr. Armijo: spoke to Director Cj at pt's assisted living, aware that pt ready for discharge, has been wearing a mask the entire time in ED, will be discharged Dr. Armijo: spoke to Director Cj at pt's assisted living, aware that pt ready for discharge, has been wearing a mask the entire time in ED, will be discharged as long as COVID negative Charlie Roa, PGY 3: Received sign out on patient. pending covid. Charlie Roa, PGY 3: covid is neg, and will d.c back to facility. Dr. Armijo: spoke to pt's niece Beth ([phone], POA/HCP), states that pt has had multiple episodes of slurred speech and ?TIA with falls, now has been slurring speech for 1 month; states that pt has expressed in past that if ill she does not want "anything done to keep her alive, just to be comfortable".  Discussed with nijoe whether or not pt would benefit from hospitalization, niece states that she thinks best thing for pt is to go back to [facility], where she feels she is being well taken care of; I am medically in agreement

## 2020-07-07 NOTE — ED PROVIDER NOTE - CLINICAL SUMMARY MEDICAL DECISION MAKING FREE TEXT BOX
80y female presenting after a fall out of bed. Denies LOC, pain. Uncertain history given patients Hx of dementia. Will get ct head/neck, labs, UA, trop.

## 2020-07-07 NOTE — ED PROVIDER NOTE - OBJECTIVE STATEMENT
80y female with HTN, dementia, hypothyroid presenting after an unwitnessed fall. Found in am by staff of NH on floor with lac to head and abrasion to the right elbow. AAOx2, denies pain. states that she fell out of bed, denies LOC. Takes [id number]. 80y female with HTN, dementia, hypothyroid presenting after an unwitnessed fall. Found in am by staff of NH on floor with abrasion to head and abrasion to the right elbow. AAOx2, denies pain. states that she fell out of bed, denies LOC. Takes [id number].

## 2020-07-07 NOTE — ED ADULT NURSE NOTE - OBJECTIVE STATEMENT
Pt presents from nursing home s/p unwitnessed fall. Pt AxOx1-2. Pt oriented to name and situation. Pt speech slurred at this time and pt slow to respond to questions. Pt sinus bradycardia on the monitor. Pt denies pain at this time. breathing even and unlabored. Pt O2 sat 98% on room air. Pt abd soft and nontender. Fungal rash noted to panus on abdomen. Pt states that she hit her head but is unsure how she fell. laceration noted to back of head. no active bleeding at this time. pt on daily aspirin. Scrape noted to right elbow. 20g IVL placed in the L AC. Will continue to monitor.

## 2020-07-07 NOTE — ED PROVIDER NOTE - PHYSICAL EXAMINATION
Gen: AAOx2, non-toxic  Head: 1cm lac  HEENT: EOMI, oral mucosa moist, normal conjunctiva  Lung: CTAB, no respiratory distress, no wheezes/rhonchi/rales B/L  CV: RRR, no murmurs, rubs or gallops  Abd: soft, NTND, no guarding  MSK: no visible deformities, no pain with passive ROM of legs and arms bilaterally .  Neuro: slurred speech.  Skin: Warm, well perfused, abrasion to the right elbow, 1cm lac to the back of the head.  Psych: normal affect.   ~Cristopher Wen PGY3 Gen: AAOx2, non-toxic  Head: 1cm lac  HEENT: EOMI, oral mucosa moist, normal conjunctiva  Lung: CTAB, no respiratory distress, no wheezes/rhonchi/rales B/L  CV: RRR, no murmurs, rubs or gallops  Abd: soft, NTND, no guarding  MSK: no visible deformities, no pain with passive ROM of legs and arms bilaterally .  Neuro: slurred speech.  Skin: Warm, well perfused, abrasion to the right elbow, 1cm abrasion to the back of the head.  Psych: normal affect.   ~Cristopher Wen PGY3

## 2020-07-08 LAB
CULTURE RESULTS: NO GROWTH — SIGNIFICANT CHANGE UP
SPECIMEN SOURCE: SIGNIFICANT CHANGE UP

## 2020-07-08 NOTE — ED POST DISCHARGE NOTE - RESULT SUMMARY
Shira RENDON from [facility] called requesting copies of DC papers and results be faxed to the facility. Fax sent [phone].

## 2020-07-13 PROBLEM — F32.9 MAJOR DEPRESSIVE DISORDER, SINGLE EPISODE, UNSPECIFIED: Chronic | Status: ACTIVE | Noted: 2019-11-10

## 2020-07-13 PROBLEM — I10 ESSENTIAL (PRIMARY) HYPERTENSION: Chronic | Status: ACTIVE | Noted: 2019-11-10

## 2020-07-13 PROBLEM — K57.90 DIVERTICULOSIS OF INTESTINE, PART UNSPECIFIED, WITHOUT PERFORATION OR ABSCESS WITHOUT BLEEDING: Chronic | Status: ACTIVE | Noted: 2019-11-10

## 2020-07-13 PROBLEM — E03.9 HYPOTHYROIDISM, UNSPECIFIED: Chronic | Status: ACTIVE | Noted: 2019-11-10

## 2020-07-24 ENCOUNTER — EMERGENCY (EMERGENCY)
Facility: HOSPITAL | Age: 80
LOS: 1 days | Discharge: NURSING, REHAB OR LONG-TERM CARE | End: 2020-07-24
Attending: EMERGENCY MEDICINE | Admitting: STUDENT IN AN ORGANIZED HEALTH CARE EDUCATION/TRAINING PROGRAM
Payer: MEDICARE

## 2020-07-24 VITALS
RESPIRATION RATE: 18 BRPM | DIASTOLIC BLOOD PRESSURE: 77 MMHG | HEART RATE: 77 BPM | OXYGEN SATURATION: 99 % | SYSTOLIC BLOOD PRESSURE: 158 MMHG

## 2020-07-24 VITALS
HEART RATE: 68 BPM | DIASTOLIC BLOOD PRESSURE: 70 MMHG | TEMPERATURE: 99 F | RESPIRATION RATE: 18 BRPM | SYSTOLIC BLOOD PRESSURE: 143 MMHG | OXYGEN SATURATION: 98 %

## 2020-07-24 DIAGNOSIS — Z71.89 OTHER SPECIFIED COUNSELING: ICD-10-CM

## 2020-07-24 DIAGNOSIS — Z90.49 ACQUIRED ABSENCE OF OTHER SPECIFIED PARTS OF DIGESTIVE TRACT: Chronic | ICD-10-CM

## 2020-07-24 DIAGNOSIS — R06.02 SHORTNESS OF BREATH: ICD-10-CM

## 2020-07-24 DIAGNOSIS — R52 PAIN, UNSPECIFIED: ICD-10-CM

## 2020-07-24 DIAGNOSIS — R11.0 NAUSEA: ICD-10-CM

## 2020-07-24 DIAGNOSIS — G93.49 OTHER ENCEPHALOPATHY: ICD-10-CM

## 2020-07-24 DIAGNOSIS — R53.2 FUNCTIONAL QUADRIPLEGIA: ICD-10-CM

## 2020-07-24 DIAGNOSIS — Z51.5 ENCOUNTER FOR PALLIATIVE CARE: ICD-10-CM

## 2020-07-24 DIAGNOSIS — K11.7 DISTURBANCES OF SALIVARY SECRETION: ICD-10-CM

## 2020-07-24 LAB
ALBUMIN SERPL ELPH-MCNC: 4.3 G/DL — SIGNIFICANT CHANGE UP (ref 3.3–5)
ALP SERPL-CCNC: 83 U/L — SIGNIFICANT CHANGE UP (ref 40–120)
ALT FLD-CCNC: 15 U/L — SIGNIFICANT CHANGE UP (ref 4–33)
ANION GAP SERPL CALC-SCNC: 17 MMO/L — HIGH (ref 7–14)
APTT BLD: 33.3 SEC — SIGNIFICANT CHANGE UP (ref 27–36.3)
AST SERPL-CCNC: 16 U/L — SIGNIFICANT CHANGE UP (ref 4–32)
BASOPHILS # BLD AUTO: 0.01 K/UL — SIGNIFICANT CHANGE UP (ref 0–0.2)
BASOPHILS NFR BLD AUTO: 0.1 % — SIGNIFICANT CHANGE UP (ref 0–2)
BILIRUB SERPL-MCNC: 0.9 MG/DL — SIGNIFICANT CHANGE UP (ref 0.2–1.2)
BUN SERPL-MCNC: 27 MG/DL — HIGH (ref 7–23)
CALCIUM SERPL-MCNC: 10.7 MG/DL — HIGH (ref 8.4–10.5)
CHLORIDE SERPL-SCNC: 103 MMOL/L — SIGNIFICANT CHANGE UP (ref 98–107)
CO2 SERPL-SCNC: 26 MMOL/L — SIGNIFICANT CHANGE UP (ref 22–31)
CREAT SERPL-MCNC: 0.68 MG/DL — SIGNIFICANT CHANGE UP (ref 0.5–1.3)
EOSINOPHIL # BLD AUTO: 0 K/UL — SIGNIFICANT CHANGE UP (ref 0–0.5)
EOSINOPHIL NFR BLD AUTO: 0 % — SIGNIFICANT CHANGE UP (ref 0–6)
GLUCOSE SERPL-MCNC: 116 MG/DL — HIGH (ref 70–99)
HCT VFR BLD CALC: 47.5 % — HIGH (ref 34.5–45)
HGB BLD-MCNC: 15.6 G/DL — HIGH (ref 11.5–15.5)
IMM GRANULOCYTES NFR BLD AUTO: 0.4 % — SIGNIFICANT CHANGE UP (ref 0–1.5)
INR BLD: 1.14 — SIGNIFICANT CHANGE UP (ref 0.88–1.17)
LYMPHOCYTES # BLD AUTO: 22.7 % — SIGNIFICANT CHANGE UP (ref 13–44)
LYMPHOCYTES # BLD AUTO: 3.11 K/UL — SIGNIFICANT CHANGE UP (ref 1–3.3)
MCHC RBC-ENTMCNC: 28.5 PG — SIGNIFICANT CHANGE UP (ref 27–34)
MCHC RBC-ENTMCNC: 32.8 % — SIGNIFICANT CHANGE UP (ref 32–36)
MCV RBC AUTO: 86.8 FL — SIGNIFICANT CHANGE UP (ref 80–100)
MONOCYTES # BLD AUTO: 0.83 K/UL — SIGNIFICANT CHANGE UP (ref 0–0.9)
MONOCYTES NFR BLD AUTO: 6 % — SIGNIFICANT CHANGE UP (ref 2–14)
NEUTROPHILS # BLD AUTO: 9.72 K/UL — HIGH (ref 1.8–7.4)
NEUTROPHILS NFR BLD AUTO: 70.8 % — SIGNIFICANT CHANGE UP (ref 43–77)
NRBC # FLD: 0 K/UL — SIGNIFICANT CHANGE UP (ref 0–0)
PLATELET # BLD AUTO: 264 K/UL — SIGNIFICANT CHANGE UP (ref 150–400)
PMV BLD: 12.2 FL — SIGNIFICANT CHANGE UP (ref 7–13)
POTASSIUM SERPL-MCNC: 3.9 MMOL/L — SIGNIFICANT CHANGE UP (ref 3.5–5.3)
POTASSIUM SERPL-SCNC: 3.9 MMOL/L — SIGNIFICANT CHANGE UP (ref 3.5–5.3)
PROT SERPL-MCNC: 8.2 G/DL — SIGNIFICANT CHANGE UP (ref 6–8.3)
PROTHROM AB SERPL-ACNC: 13 SEC — SIGNIFICANT CHANGE UP (ref 9.8–13.1)
RBC # BLD: 5.47 M/UL — HIGH (ref 3.8–5.2)
RBC # FLD: 16.7 % — HIGH (ref 10.3–14.5)
SODIUM SERPL-SCNC: 146 MMOL/L — HIGH (ref 135–145)
WBC # BLD: 13.73 K/UL — HIGH (ref 3.8–10.5)
WBC # FLD AUTO: 13.73 K/UL — HIGH (ref 3.8–10.5)

## 2020-07-24 PROCEDURE — 99202 OFFICE O/P NEW SF 15 MIN: CPT

## 2020-07-24 PROCEDURE — 99283 EMERGENCY DEPT VISIT LOW MDM: CPT | Mod: GC

## 2020-07-24 PROCEDURE — 71045 X-RAY EXAM CHEST 1 VIEW: CPT | Mod: 26

## 2020-07-24 PROCEDURE — 99497 ADVNCD CARE PLAN 30 MIN: CPT | Mod: 25

## 2020-07-24 NOTE — CONSULT NOTE ADULT - SUBJECTIVE AND OBJECTIVE BOX
HPI:  Limited history based on documentation as the patient cannot communicate well. Other history supplemented by niece and HCP, Beth Colon @ [phone].    79yo F with dementia, multiple TIA's with residual dysarthria, dysphagia. She is also bedbound with profound debility. According to the niece, she had wanted a hospice referral for the patient and she was told that the patient needed to be admitted to the hospital for this to happen.     The patient herself is able to answer simple yes/no questions. She is mildly SOB with some phlegm bothering her. No other complaints    =================================================================================================  ----- PERTINENT PMH/ SXH/ FHX  No pertinent past medical history    No significant past surgical history    FAMILY HISTORY:  No pertinent family history in first degree relatives      ----- SOCIAL HISTORY:   [ ] Unable to elicit  Significant other/partner: Yes [ ]  No [ ] Children:  Yes [ ]  No [ ] Sikh/Spirituality:  Substance hx: Yes[ ]  No [ ]   Tobacco hx:  Yes [ ] No [ ]   Alcohol hx: Yes [ ] No [ ]   Home Opioid hx:  [ ] I-Stop Reference No:  Living Situation: [ ]Home  [X ]Long term care- ASSISTED LIVING FACILITY ([facility])  [ ]Rehab [ ]Other    ----- ADVANCE DIRECTIVES:    DNR  MOLST  [ ]  Living Will  [ ]   DECISION MAKER(s):  [X ] Health Care Proxy(s)  [ ] Surrogate(s)  [ ] Guardian           Name(s): Phone Number(s):  Beth Colon @ [phone]    ----- BASELINE (I)ADL(s) (prior to admission):  [place]: [ ]Total  [ ] Moderate [ ]Dependent  Palliative Performance Status Version 2: 30%    =================================================================================================  -----MEDICATIONS AND ALLERGIES:  Allergies    No Known Allergies    Intolerances    MEDICATIONS  (STANDING):    MEDICATIONS  (PRN):        =================================================================================================  ----- SYMPTOM ASSESSMENT: [ ]Unable to obtain due to poor mentation   Source if other than patient:  [ ]Family   [ ]Team     Pain (Impact on QOL):  DENIED  Location -   Severity -        Minimal acceptable level (0-10 scale):  Quality:   Onset:   Duration:                 Aggravating factors -  Relieving factors -  Radiation -    PAIN AD Score:     REVIEW OF SYSTEMS (not present if not checked off):  Unable to elicit [ ]  Dyspnea: [X ]  Anxiety: [ ]  Fatigue: [ ]  Nausea: [ ]                       Loss of appetite: [ ]  Constipation: [ ]  Grief: [ ]    Other Symptoms:  [X ]All other review of systems negative       =================================================================================================  ----- PHYSICAL EXAM:  Vital Signs Last 24 Hrs  T(C): 36.7 (24 Jul 2020 15:30), Max: 37.1 (24 Jul 2020 14:43)  T(F): 98.1 (24 Jul 2020 15:30), Max: 98.7 (24 Jul 2020 14:43)  HR: 77 (24 Jul 2020 15:23) (68 - 77)  BP: 168/77 (24 Jul 2020 15:23) (143/70 - 168/77)  BP(mean): --  RR: 20 (24 Jul 2020 15:23) (18 - 20)  SpO2: 98% (24 Jul 2020 15:23) (98% - 98%) I&O's Summary    GEN: Awake, LETHARGIC, NAD  HEENT: Grossly normal, +UPPER AIRWAY SECRETIONS  PULM: Comfortable work of breathing, +RHONCHI  CV: RRR, normal S1 and S2, no murmurs, Regular pulse  ABD: Soft, non-tender, +BS  EXT: No edema, No deformities  PSYCH: UNABLE TO ASSESS  NEURO: No facial asymmetry, no tremors, no observed movement disorders  SKIN: No rashes or lesions on exposed skin, No jaundice      =================================================================================================  ----- LABS:

## 2020-07-24 NOTE — ED PROVIDER NOTE - CLINICAL SUMMARY MEDICAL DECISION MAKING FREE TEXT BOX
78 yo female with HTN, dementia, hypothyroid, ?CVA in past with residual slurred speech presents from assisted living ([facility]) for hospice eval. Palliative called. 78 yo female with HTN, dementia, hypothyroid, ?CVA in past with residual slurred speech presents from assisted living ([facility]) for hospice eval. Palliative called. Comfort care measures. MOLST in place, DNR/DNI.

## 2020-07-24 NOTE — CONSULT NOTE ADULT - PROBLEM SELECTOR RECOMMENDATION 8
Thank you for allowing us to participate in your patient's care. We will SIGN OFF. Please page [phone] for any q's or c's.     Ruel Cast  Palliative Medicine

## 2020-07-24 NOTE — CONSULT NOTE ADULT - PROBLEM SELECTOR RECOMMENDATION 7
.  # Code: DNR, DNI, No feeding tube, Comfort measures only  # HCP:  Beth Colon @ [phone]    > Spoke to niece and she re-affirmed plan for hospice  > Re-affirmed DNR, DNI status  > Spoke to [facility] admin + ED SW. [facility] are [facility] and we stressed they could make the referral from their facility. We connected them with the ED SW to plan for transfer back to the assisted.  > PLAN: Transfer back to [facility] and hospice  > We will sign off    Due to visitation restrictions in the hospital in light of COVID pandemic, all discussions with the patient/ patient's healthcare proxy or surrogate have been done via telehealth. This is to prevent spread of infection within the hospital and out in the community. Total time discussing advance care planning, goals of care discussions, and discussions about code status and hospice = 30 mins

## 2020-07-24 NOTE — ED ADULT TRIAGE NOTE - CHIEF COMPLAINT QUOTE
Pt sent over for failure to thrive ,pt unable to eat pt noted to have green phlegm in her moth and making gurgling sounds. Pt with hx of dysarthria

## 2020-07-24 NOTE — CONSULT NOTE ADULT - ASSESSMENT
79yo F with dementia, multiple TIA's with residual dysarthria, dysphagia. She is also bedbound with profound debility. According to the niece, she had wanted a hospice referral for the patient and she was told that the patient needed to be admitted to the hospital for this to happen.     ===========================================================  END OF LIFE CARE RECOMMENDATIONS:  - Comfort care measures only  - Discontinue all lab tests  - Discontinue all monitors and alarms    # ENCEPHALOPATHY/ AGITATION/ TERMINAL DELIRIUM  - Pls do not give sedatives to hypoactive delirium or non-distressing agitation  - Consider: Haldol 0.5mg or Ativan 0.5mg IV/SQ/PO q4h PRN  - Continue clear communication as you are with patient and family    # RESPIRATORY FAILURE/ DYSPNEA/ SHORTNESS OF BREATH  - Consider: Morphine 0.5mg IV/SQ q4h PRN for labored breathing (or pain) if renal function is decent or no h/o CKD  - If patient can tolerate PO, consider: Morphine 2.5mg PO q4h PRN if renal function is decent or no h/o CKD  - If there is some renal insufficiency, consider: Dilaudid 0.1mg IV q4h PRN or Dilaudid 1mg PO q4h PRN  - Can supplement O2 via NC for comfort or place a bedside fan to the face    # INCREASED OROPHARYNGEAL SECRETIONS/ CONGESTION OF UPPER AIRWAY/ TERMINAL SECRETIONS  - Consider: Glycopyrrolate 0.4mg IV/ SQ q4h PRN  - Can also consider a Scopolamine patch, although watch out for delirium    # PAIN  - Opioids as above    # NAUSEA  - Consider: Zofran 4mg IV q4h PRN  - Can also consider: Haldol 0.5mg or Ativan 0.5mg IV/IM/SQ/PO q4h PRN for nausea    # FEVERS  - Consider: Acetaminophen 650mg PO q6h PRN    # CONSTIPATION  - Will defer constipation management for comfort    # FUNCTIONAL QUADRIPLEGIA  - Pls assist with ADL's  - Skin care as per protocol

## 2020-07-24 NOTE — ED PROVIDER NOTE - ATTENDING CONTRIBUTION TO CARE
Pt was seen and evaluated by me. Pt is an 78 yo female with HTN, dementia, hypothyroid, CVA in past with residual slurred speech who presented to the ED from [facility] for failure to thrive. Pt currently resigns at [facility] and was noted to have increased lethargy and difficulty breathing. Pt is awake with sats of 77% off NRB. On NRB at 100%. Pt awake but not responding to questions. Contacted niece who confirmed pt is comfort care and has been trying to get pt to Hospice but was told the pt needed to come to the hospital. Coarse breath sounds b/l. RRR. Abd soft, non-tender. Awake no focal deficits.   Concern for aspiration PNA/FTT  Palliative

## 2020-07-24 NOTE — ED ADULT NURSE NOTE - OBJECTIVE STATEMENT
Pt arrived from assisted living facility with dysphagia - gurgling in her throat with recent TIA - suctioned for copious thick, white secretions.   Pt unable to articulate words but following commands and purposeful movement x 4 extremities.  Placed on continuous cardiac monitor and pulse oximetry with room air spo2 79% - placed on 100% NRB.   IV placed, blood work sent.   No IVF as per MD Sanchez as per MOLST form.   No skin breakdown noted.   will continue to assess patient.

## 2020-07-24 NOTE — ED PROVIDER NOTE - PATIENT PORTAL LINK FT
You can access the FollowMyHealth Patient Portal offered by [facility] by registering at the following website: [email or web address]. By joining [facility]’s FollowMyHealth portal, you will also be able to view your health information using other applications (apps) compatible with our system.

## 2020-07-24 NOTE — ED PROVIDER NOTE - OBJECTIVE STATEMENT
PLEASE NOTE: Patient's teal name is Beth Reddy, but has been incorrectly registered under niece's name as Beth Colon. Spoke to registration, charts will be corrected and merged after discharge. To see previous history and hospital records, see her real chart, MRN     80 yo female with HTN, dementia, hypothyroid, ?CVA in past with residual slurred speech, presents to ED from assisted living ([facility]). Hx obtained from mireille, who is HCP/POA.     HCP states she has had progressive decline over last 8 months, especially for last month since TIA caused dysphagia. She was placed on a pureed diet and in the last couple days refused to eat. HCP doesn't want feeding tube. Came with [facility]OL[facility], DNR/DNI, no feeding tube, IVF. Wanted her to go to hospice, but was told she needs to come to hospital to be accepted. PLEASE NOTE: Patient's real name is Beth Reddy, but was incorrectly registered under niece's name as Beth Colon (since corrected). Spoke to registration, charts will be corrected and merged after discharge. To see previous history and hospital records, see her real chart, MRN [id number].    80 yo female with HTN, dementia, hypothyroid, ?CVA in past with residual slurred speech, presents to ED from assisted living ([facility]). Hx obtained from mireille, who is HCP/POA.     HCP states she has had progressive decline over last 8 months, especially for last month since TIA caused dysphagia. She was placed on a pureed diet and in the last couple days refused to eat. HCP doesn't want feeding tube. Came with MOLST, DNR/DNI, no feeding tube, IVF. Wanted her to go to hospice, but was told she needs to come to hospital to be accepted.

## 2020-07-24 NOTE — PROVIDER CONTACT NOTE (OTHER) - ASSESSMENT
Pt is cleared to return back to [facility] in [place]. [phone]. Spoke with Martinez RENDON, he accepted pt back pending covid result, which I will fax to [phone] (F) after resulted. As per [facility] Palliative Care Team, pt is referred to Hospice at [facility]. As per Martinez RENDON, pt is referred to [facility] today. I spoke with pt's Kasey Campos at [phone], she is aware of the plan and she is in agreement to send pt back to [place]. I arranged [facility] [phone]. Trip# [id number], P/U 6 PM. Notified provider and in agreement.

## 2020-07-25 LAB — SARS-COV-2 RNA SPEC QL NAA+PROBE: SIGNIFICANT CHANGE UP

## 2020-07-25 NOTE — PROVIDER CONTACT NOTE (OTHER) - BACKGROUND
Pts COVID-19 results came back, SW faxed results to [facility] ([phone]). No further SW needs at this time.

## 2021-07-30 NOTE — PHYSICAL THERAPY INITIAL EVALUATION ADULT - BED MOBILITY TRAINING, PT EVAL
07/30/21 0830   Subjective/Objective   Requires PT Follow Up Yes   Attempted, but not seen Yes   Reason not seen Nursing with patient   Re-Attempt Plan Will re-attempt later today   Subjective patient more lethargic and continues to be going through DTs      GOAL: pt will perform bed mobility independent in 2 weeks

## 2022-11-11 NOTE — PROGRESS NOTE ADULT - ASSESSMENT
[de-identified] : First-time visit for this 35-year-old healthy female she is here with a complaint of approximate 9 days right knee anterior pain.  She recalls no specific accident injury or initiating traumatic event she has no change in her workout routine she has noticed pain going up especially down stairs with anterior knee pain and she feels a crunching sensation just slight pain is mild and never severe.  Patient is here for first-time visit.\par \par Patient does have 2 small children under the age of 5 she does a lot of kneeling and bending over. 78 yo woman w progressive worsening in function w memory loss, frequent falls, dysarthria, house unkept, not clear if taking her meds, ptn is unkept, has multiple echymoses  PLAN:   MRI/MRA H/N not suggestive of recent CVA, ptn AND HCP refuse LP   Neuro consult and input aprpeciated  dysarthria, improved, prob post Rx for UTI  questionable dysphagia, bedside S&S eval done, needs MBS, scheduled for am  ptn refused  ENT consult to R/O vocal cord paralysis  Carotid duplex is benign  Rhabdomyolysis is omproving, CPK dropping  cont hydration w NS, trend CPK  nl TSH, Vit B12, Folate, neg RPR  UA c/w UTI, urine culture is  polymicrobial, on CTX day 3/3  EKG w LVH and possible lateral infarct, TTE is benign  BP elevated on Norvasc 5 mg daily, raise to 10 mg  PT eval done, recommend MARTHA Parks is now her HCP form on the chart,  she has looked at multiple assisted living and it appears ptn is ready for that transition  GOC d/w ptn/niece, she is full code  DVT ppx w sc Lovenox

## 2024-01-01 NOTE — PHYSICAL THERAPY INITIAL EVALUATION ADULT - DIAGNOSIS, PT EVAL
difficulty with amb , transfers; decreased strength/endurance/balance <-- Click to add NO significant Past Surgical History

## 2024-07-23 NOTE — ED ADULT NURSE NOTE - CCCP TRG CHIEF CMPLNT
Reached out to pt. via outgoing call regarding scheduling first session with new clinician. Pt. did not  phone call, so a voicemail was left regarding instructions on how to call back  regarding scheduling.     Will reach out next week regarding scheduling again      unwitnessed fall on ASA